# Patient Record
Sex: MALE | Race: BLACK OR AFRICAN AMERICAN | Employment: UNEMPLOYED | URBAN - METROPOLITAN AREA
[De-identification: names, ages, dates, MRNs, and addresses within clinical notes are randomized per-mention and may not be internally consistent; named-entity substitution may affect disease eponyms.]

---

## 2021-07-31 ENCOUNTER — APPOINTMENT (OUTPATIENT)
Dept: GENERAL RADIOLOGY | Facility: CLINIC | Age: 41
End: 2021-07-31
Attending: EMERGENCY MEDICINE
Payer: COMMERCIAL

## 2021-07-31 ENCOUNTER — HOSPITAL ENCOUNTER (EMERGENCY)
Facility: CLINIC | Age: 41
Discharge: HOME | End: 2021-07-31
Attending: EMERGENCY MEDICINE | Admitting: EMERGENCY MEDICINE
Payer: COMMERCIAL

## 2021-07-31 VITALS
TEMPERATURE: 98 F | SYSTOLIC BLOOD PRESSURE: 142 MMHG | RESPIRATION RATE: 18 BRPM | OXYGEN SATURATION: 99 % | WEIGHT: 172 LBS | HEART RATE: 74 BPM | DIASTOLIC BLOOD PRESSURE: 99 MMHG

## 2021-07-31 DIAGNOSIS — G47.00 INSOMNIA, UNSPECIFIED TYPE: ICD-10-CM

## 2021-07-31 DIAGNOSIS — R07.89 ATYPICAL CHEST PAIN: ICD-10-CM

## 2021-07-31 LAB
ALBUMIN SERPL-MCNC: 4 G/DL (ref 3.4–5)
ALP SERPL-CCNC: 54 U/L (ref 40–150)
ALT SERPL W P-5'-P-CCNC: 25 U/L (ref 0–70)
ANION GAP SERPL CALCULATED.3IONS-SCNC: 5 MMOL/L (ref 3–14)
AST SERPL W P-5'-P-CCNC: 17 U/L (ref 0–45)
ATRIAL RATE - MUSE: 81 BPM
BASOPHILS # BLD AUTO: 0 10E3/UL (ref 0–0.2)
BASOPHILS NFR BLD AUTO: 0 %
BILIRUB SERPL-MCNC: 0.5 MG/DL (ref 0.2–1.3)
BUN SERPL-MCNC: 8 MG/DL (ref 7–30)
CALCIUM SERPL-MCNC: 9 MG/DL (ref 8.5–10.1)
CHLORIDE BLD-SCNC: 104 MMOL/L (ref 94–109)
CO2 SERPL-SCNC: 31 MMOL/L (ref 20–32)
CREAT SERPL-MCNC: 0.83 MG/DL (ref 0.66–1.25)
D DIMER PPP FEU-MCNC: <0.27 UG/ML FEU (ref 0–0.5)
DIASTOLIC BLOOD PRESSURE - MUSE: NORMAL MMHG
EOSINOPHIL # BLD AUTO: 0.1 10E3/UL (ref 0–0.7)
EOSINOPHIL NFR BLD AUTO: 1 %
ERYTHROCYTE [DISTWIDTH] IN BLOOD BY AUTOMATED COUNT: 12.6 % (ref 10–15)
GFR SERPL CREATININE-BSD FRML MDRD: >90 ML/MIN/1.73M2
GLUCOSE BLD-MCNC: 164 MG/DL (ref 70–99)
HCT VFR BLD AUTO: 47.1 % (ref 40–53)
HGB BLD-MCNC: 15.4 G/DL (ref 13.3–17.7)
HOLD SPECIMEN: NORMAL
IMM GRANULOCYTES # BLD: 0 10E3/UL
IMM GRANULOCYTES NFR BLD: 0 %
INTERPRETATION ECG - MUSE: NORMAL
LIPASE SERPL-CCNC: 138 U/L (ref 73–393)
LYMPHOCYTES # BLD AUTO: 2.5 10E3/UL (ref 0.8–5.3)
LYMPHOCYTES NFR BLD AUTO: 28 %
MCH RBC QN AUTO: 28 PG (ref 26.5–33)
MCHC RBC AUTO-ENTMCNC: 32.7 G/DL (ref 31.5–36.5)
MCV RBC AUTO: 86 FL (ref 78–100)
MONOCYTES # BLD AUTO: 0.8 10E3/UL (ref 0–1.3)
MONOCYTES NFR BLD AUTO: 9 %
NEUTROPHILS # BLD AUTO: 5.4 10E3/UL (ref 1.6–8.3)
NEUTROPHILS NFR BLD AUTO: 62 %
NRBC # BLD AUTO: 0 10E3/UL
NRBC BLD AUTO-RTO: 0 /100
P AXIS - MUSE: 58 DEGREES
PLATELET # BLD AUTO: 324 10E3/UL (ref 150–450)
POTASSIUM BLD-SCNC: 3.8 MMOL/L (ref 3.4–5.3)
PR INTERVAL - MUSE: 150 MS
PROT SERPL-MCNC: 7.3 G/DL (ref 6.8–8.8)
QRS DURATION - MUSE: 92 MS
QT - MUSE: 362 MS
QTC - MUSE: 420 MS
R AXIS - MUSE: 31 DEGREES
RBC # BLD AUTO: 5.5 10E6/UL (ref 4.4–5.9)
SODIUM SERPL-SCNC: 140 MMOL/L (ref 133–144)
SYSTOLIC BLOOD PRESSURE - MUSE: NORMAL MMHG
T AXIS - MUSE: 42 DEGREES
TROPONIN I SERPL-MCNC: <0.015 UG/L (ref 0–0.04)
VENTRICULAR RATE- MUSE: 81 BPM
WBC # BLD AUTO: 8.8 10E3/UL (ref 4–11)

## 2021-07-31 PROCEDURE — 93005 ELECTROCARDIOGRAM TRACING: CPT | Performed by: EMERGENCY MEDICINE

## 2021-07-31 PROCEDURE — 99285 EMERGENCY DEPT VISIT HI MDM: CPT | Mod: 25 | Performed by: EMERGENCY MEDICINE

## 2021-07-31 PROCEDURE — 36415 COLL VENOUS BLD VENIPUNCTURE: CPT | Performed by: EMERGENCY MEDICINE

## 2021-07-31 PROCEDURE — 85379 FIBRIN DEGRADATION QUANT: CPT | Performed by: EMERGENCY MEDICINE

## 2021-07-31 PROCEDURE — 80053 COMPREHEN METABOLIC PANEL: CPT | Performed by: EMERGENCY MEDICINE

## 2021-07-31 PROCEDURE — 85025 COMPLETE CBC W/AUTO DIFF WBC: CPT | Performed by: EMERGENCY MEDICINE

## 2021-07-31 PROCEDURE — 93010 ELECTROCARDIOGRAM REPORT: CPT | Performed by: EMERGENCY MEDICINE

## 2021-07-31 PROCEDURE — 71046 X-RAY EXAM CHEST 2 VIEWS: CPT

## 2021-07-31 PROCEDURE — 84484 ASSAY OF TROPONIN QUANT: CPT | Performed by: EMERGENCY MEDICINE

## 2021-07-31 PROCEDURE — 83690 ASSAY OF LIPASE: CPT | Performed by: EMERGENCY MEDICINE

## 2021-07-31 RX ORDER — TRAZODONE HYDROCHLORIDE 50 MG/1
50-75 TABLET, FILM COATED ORAL
Qty: 15 TABLET | Refills: 0 | Status: ON HOLD | OUTPATIENT
Start: 2021-07-31 | End: 2021-11-11

## 2021-07-31 ASSESSMENT — ENCOUNTER SYMPTOMS
SLEEP DISTURBANCE: 1
APPETITE CHANGE: 1

## 2021-07-31 NOTE — ED PROVIDER NOTES
"ED Provider Note  [facility]      History     Chief Complaint   Patient presents with     Chest Pain     x2 nights     The history is provided by the patient and medical records.     Harley Daniel is an otherwise healthy 41 year old male who presents to the Emergency Department for evaluation of chest pain.  Patient states that he has had left-sided chest pain for the past month.  He describes the pain as heavy. Patient states that his chest pain worsens at night.  Patient reports that he has had this pain since he got his COVID-19 shot on 6/4/2021 per [facility].  Patient reports that he presented to an ER in [place] 3 weeks ago for his chest pain and no cause of his pain was found.   Patient states that for the last 10 days he has not been able to sleep much or eat.  He states that he \"feels off\".  Patient reports that he has never had problems with sleep before this.  Patient states that yesterday he presented to urgent care and was prescribed a sleeping medication that he took last night.  He states that he does not know the name of this medication, but that he did not like how it made him feel.  Patient states that he has tried OTC sleep medications and they have not worked either.  Patient states that he lives in [place] and that he has a PCP there.  Patient is currently visiting his family in the [facility] so that his mother can care for him while he is not feeling well.  Patient states he does not take any medications and that he does not have any health problems.  Patient states he does not smoke cigarettes.      Past Medical History  History reviewed. No pertinent past medical history.  History reviewed. No pertinent surgical history.  traZODone (DESYREL) 50 MG tablet      No Known Allergies  Family History  No family history on file.  Social History   Social History     Tobacco Use     Smoking status: Never Smoker     Smokeless tobacco: Never Used   Substance Use " Topics     Alcohol use: Not Currently     Drug use: Not Currently      Past medical history, past surgical history, medications, allergies, family history, and social history were reviewed with the patient. No additional pertinent items.       Review of Systems   Constitutional: Positive for appetite change.   Cardiovascular: Positive for chest pain.   Psychiatric/Behavioral: Positive for sleep disturbance.   All other systems reviewed and are negative.      Physical Exam   BP: (!) 142/94  Pulse: 98  Temp: 98.7  F (37.1  C)  Resp: 18  Weight: 78 kg (172 lb)  SpO2: 99 %  Physical Exam  Vitals and nursing note reviewed.   Constitutional:       Appearance: He is normal weight.   HENT:      Head: Normocephalic and atraumatic.   Eyes:      Extraocular Movements: Extraocular movements intact.      Pupils: Pupils are equal, round, and reactive to light.   Cardiovascular:      Rate and Rhythm: Normal rate and regular rhythm.      Heart sounds: Normal heart sounds.   Pulmonary:      Effort: Pulmonary effort is normal.      Breath sounds: Normal breath sounds.   Abdominal:      General: Bowel sounds are normal.      Palpations: Abdomen is soft.   Musculoskeletal:         General: Normal range of motion.      Cervical back: Normal range of motion and neck supple.   Skin:     General: Skin is warm and dry.      Coloration: Skin is not cyanotic.   Neurological:      General: No focal deficit present.      Mental Status: He is alert and oriented to person, place, and time.   Psychiatric:         Mood and Affect: Mood is anxious.         ED Course     1:37 PM  The patient was seen and examined by Cherrie Montemayor MD in Room ED01.  Procedures            EKG Interpretation:      Interpreted by Cherrie Montemayor MD  Time reviewed: 1330  Symptoms at time of EK month of left chest heaviness  Rhythm: normal sinus   Rate: normal  Axis: normal  Ectopy: none  Conduction: normal  ST Segments/ T Waves: No ST-T wave changes  Q Waves:  none  Comparison to prior: No old EKG available    Clinical Impression: normal EKG       Results for orders placed or performed during the hospital encounter of 07/31/21   XR Chest 2 Views     Status: None    Narrative    CHEST TWO VIEWS  7/31/2021 4:34 PM     HISTORY:  Chest pain.    COMPARISON: None.      Impression    IMPRESSION: Negative chest. Lungs clear. No pleural effusions.    KARTHIK ALANIZ MD         SYSTEM ID:  [id number]   Extra Blue Top Tube     Status: None   Result Value Ref Range    Hold Specimen JIC    Extra Red Top Tube     Status: None   Result Value Ref Range    Hold Specimen JIC    Extra Green Top (Lithium Heparin) Tube     Status: None   Result Value Ref Range    Hold Specimen JIC    Extra Purple Top Tube     Status: None   Result Value Ref Range    Hold Specimen JIC    D dimer quantitative     Status: Normal   Result Value Ref Range    D-Dimer Quantitative <0.27 0.00 - 0.50 ug/mL FEU    Narrative    This D-dimer assay is intended for use in conjunction with a clinical pretest probability assessment model to exclude pulmonary embolism (PE) and deep venous thrombosis (DVT) in outpatients suspected of PE or DVT. The cut-off value is 0.50 ug/mL FEU.   Troponin I     Status: Normal   Result Value Ref Range    Troponin I <0.015 0.000 - 0.045 ug/L   Lipase     Status: Normal   Result Value Ref Range    Lipase 138 73 - 393 U/L   Comprehensive metabolic panel     Status: Abnormal   Result Value Ref Range    Sodium 140 133 - 144 mmol/L    Potassium 3.8 3.4 - 5.3 mmol/L    Chloride 104 94 - 109 mmol/L    Carbon Dioxide (CO2) 31 20 - 32 mmol/L    Anion Gap 5 3 - 14 mmol/L    Urea Nitrogen 8 7 - 30 mg/dL    Creatinine 0.83 0.66 - 1.25 mg/dL    Calcium 9.0 8.5 - 10.1 mg/dL    Glucose 164 (H) 70 - 99 mg/dL    Alkaline Phosphatase 54 40 - 150 U/L    AST 17 0 - 45 U/L    ALT 25 0 - 70 U/L    Protein Total 7.3 6.8 - 8.8 g/dL    Albumin 4.0 3.4 - 5.0 g/dL    Bilirubin Total 0.5 0.2 - 1.3 mg/dL    GFR Estimate  >90 >60 mL/min/1.73m2   CBC with platelets and differential     Status: None   Result Value Ref Range    WBC Count 8.8 4.0 - 11.0 10e3/uL    RBC Count 5.50 4.40 - 5.90 10e6/uL    Hemoglobin 15.4 13.3 - 17.7 g/dL    Hematocrit 47.1 40.0 - 53.0 %    MCV 86 78 - 100 fL    MCH 28.0 26.5 - 33.0 pg    MCHC 32.7 31.5 - 36.5 g/dL    RDW 12.6 10.0 - 15.0 %    Platelet Count 324 150 - 450 10e3/uL    % Neutrophils 62 %    % Lymphocytes 28 %    % Monocytes 9 %    % Eosinophils 1 %    % Basophils 0 %    % Immature Granulocytes 0 %    NRBCs per 100 WBC 0 <1 /100    Absolute Neutrophils 5.4 1.6 - 8.3 10e3/uL    Absolute Lymphocytes 2.5 0.8 - 5.3 10e3/uL    Absolute Monocytes 0.8 0.0 - 1.3 10e3/uL    Absolute Eosinophils 0.1 0.0 - 0.7 10e3/uL    Absolute Basophils 0.0 0.0 - 0.2 10e3/uL    Absolute Immature Granulocytes 0.0 <=0.0 10e3/uL    Absolute NRBCs 0.0 10e3/uL   EKG 12-lead, tracing only     Status: None (Preliminary result)   Result Value Ref Range    Systolic Blood Pressure  mmHg    Diastolic Blood Pressure  mmHg    Ventricular Rate 81 BPM    Atrial Rate 81 BPM    NJ Interval 150 ms    QRS Duration 92 ms     ms    QTc 420 ms    P Axis 58 degrees    R AXIS 31 degrees    T Axis 42 degrees    Interpretation ECG Sinus rhythm  Normal ECG      Brunson Draw     Status: None    Narrative    The following orders were created for panel order Brunson Draw.  Procedure                               Abnormality         Status                     ---------                               -----------         ------                     Extra Blue Top Tube[[id number]]                              Final result               Extra Red Top Tube[[id number]]                               Final result               Extra Green Top (Lithium...[[id number]]                      Final result               Extra Purple Top Tube[[id number]]                            Final result                 Please view results for these tests on the individual  orders.   CBC with platelets differential     Status: None    Narrative    The following orders were created for panel order CBC with platelets differential.  Procedure                               Abnormality         Status                     ---------                               -----------         ------                     CBC with platelets and d...[[id number]]                      Final result                 Please view results for these tests on the individual orders.     Medications - No data to display     Assessments & Plan (with Medical Decision Making)   The patient has unremarkable pmh who presents to the ED for 1 month of left chest discomfort and insomnia. He says he has had issues since having his covid vaccinations.  He appears well on exam.   Diff dx:  Muscular pain, vaccination side effects, pe, pneumonia, acs, anxiety.  ECG appears normal.  Troponin <0.27. He appears mildly anxious on exam.  We are unable to Access old records.  Labs ordered and reviewed.  He denies cardiac risk factors.  No dm, htn, elevated cholesterol, smoker.  D dimer was sent a negative.  Comp met negative except blood glucose of 164.  Lipase normal.  Wbc normal.   cxr sent and negative.  His chest pain has been there for 1 month.  Etiology unclear.  I feel he can f/u with his pmd for this.  He needs help with insomnia.  He seems to be having anxiety which may be contributing to his symptoms.  I will start on trazodone and ask that he f/u with pmd for recheck.   I have reviewed the nursing notes. I have reviewed the findings, diagnosis, plan and need for follow up with the patient.    New Prescriptions    TRAZODONE (DESYREL) 50 MG TABLET    Take 1-1.5 tablets (50-75 mg) by mouth nightly as needed for sleep       Final diagnoses:   Insomnia, unspecified type   Atypical chest pain     IHaylie, am serving as a trained medical scribe to document services personally performed by Cherrie Montemayor MD, based on the  provider's statements to me.     I, Cherrie Montemayor MD, was physically present and have reviewed and verified the accuracy of this note documented by Haylie Mejia.  --  Cherrie Montemayor MD  [facility] EMERGENCY DEPARTMENT  7/31/2021     Cherrie Montemayor MD  07/31/21 0754

## 2021-07-31 NOTE — DISCHARGE INSTRUCTIONS
Please schedule a follow up appointment with your primary care doctor in 1-2 weeks for recheck of your current symptoms.      You can take trazodone for sleep.

## 2021-10-29 ENCOUNTER — TELEPHONE (OUTPATIENT)
Dept: BEHAVIORAL HEALTH | Facility: CLINIC | Age: 41
End: 2021-10-29

## 2021-10-29 ENCOUNTER — HOSPITAL ENCOUNTER (EMERGENCY)
Facility: CLINIC | Age: 41
Discharge: PSYCHIATRIC OR SUBSTANCE USE FACILITY | End: 2021-10-30
Attending: EMERGENCY MEDICINE | Admitting: EMERGENCY MEDICINE
Payer: COMMERCIAL

## 2021-10-29 DIAGNOSIS — F29 PSYCHOSIS, UNSPECIFIED PSYCHOSIS TYPE (H): ICD-10-CM

## 2021-10-29 DIAGNOSIS — F22 PARANOID (H): ICD-10-CM

## 2021-10-29 LAB
AMPHETAMINES UR QL SCN: NORMAL
BARBITURATES UR QL: NORMAL
BENZODIAZ UR QL: NORMAL
CANNABINOIDS UR QL SCN: NORMAL
COCAINE UR QL: NORMAL
OPIATES UR QL SCN: NORMAL
PCP UR QL SCN: NORMAL
SARS-COV-2 RNA RESP QL NAA+PROBE: NEGATIVE

## 2021-10-29 PROCEDURE — 99285 EMERGENCY DEPT VISIT HI MDM: CPT | Mod: 25

## 2021-10-29 PROCEDURE — 90791 PSYCH DIAGNOSTIC EVALUATION: CPT

## 2021-10-29 PROCEDURE — C9803 HOPD COVID-19 SPEC COLLECT: HCPCS

## 2021-10-29 PROCEDURE — 87635 SARS-COV-2 COVID-19 AMP PRB: CPT | Performed by: EMERGENCY MEDICINE

## 2021-10-29 PROCEDURE — 80307 DRUG TEST PRSMV CHEM ANLYZR: CPT | Performed by: EMERGENCY MEDICINE

## 2021-10-29 RX ORDER — CLONAZEPAM 1 MG/1
1 TABLET ORAL 2 TIMES DAILY PRN
Status: ON HOLD | COMMUNITY
End: 2021-11-11

## 2021-10-29 RX ORDER — ACETAMINOPHEN 325 MG/1
325-650 TABLET ORAL EVERY 6 HOURS PRN
Status: ON HOLD | COMMUNITY
End: 2021-11-11

## 2021-10-29 RX ORDER — OLANZAPINE 10 MG/1
10 TABLET, ORALLY DISINTEGRATING ORAL 2 TIMES DAILY PRN
Status: DISCONTINUED | OUTPATIENT
Start: 2021-10-29 | End: 2021-10-30 | Stop reason: HOSPADM

## 2021-10-29 ASSESSMENT — ENCOUNTER SYMPTOMS
NERVOUS/ANXIOUS: 1
SLEEP DISTURBANCE: 1

## 2021-10-29 NOTE — ED NOTES
Calm, cooperative. Denies any pain, pt reports being unsure why he is here. MD plan to speak to family member that is in waiting area for additional information.

## 2021-10-29 NOTE — ED NOTES
Bed: BH3  Expected date:   Expected time:   Means of arrival:   Comments:  [facility]  - 445 41 M Psych eval eta 150

## 2021-10-29 NOTE — ED NOTES
Video Observation initiated, patient informed. Patient changed into scrubs, belongings secured per unit protocol.     Crys Zendejas RN

## 2021-10-29 NOTE — ED PROVIDER NOTES
History   Chief Complaint:  Psychiatric Evaluation    The history is provided by the patient, the EMS personnel and a relative.      Harley Daniel is a 41 year old male who presents for a psychiatric evaluation. The patient comes to the ED after an appointment with his psychiatrist who placed him on a hold. He is joined by his cousin who he has been living with. His cousin says that for the last four days, he has been living out of hotels and his car, and that for the past two months, he has been acting strangely. He has been sleeping very little, is paranoid thinking that he and his children are in danger, and for the past two weeks, has not been taking his prescription medications. The patient denies any suicidal ideation, homicidal ideation, drug, or alcohol use; the patient's cousin is in agreement.     ShysinGerardo Contact Info:  Phone: [phone]    Review of Systems   Respiratory: Negative for shortness of breath.    Cardiovascular: Negative for chest pain.   Gastrointestinal: Negative for abdominal pain.   Psychiatric/Behavioral: Positive for sleep disturbance. Negative for suicidal ideas. The patient is nervous/anxious.         + paranoia   - no homicidal ideation    All other systems reviewed and are negative.      Allergies:  The patient has no known allergies.     Medications:  The patient is not currently taking any prescribed medications.    Past Medical History:    GERD  IBS  Lactose intolerance  Right trigeminal neuralgia  Dysphagia  Palpitations    Social History:  The patient presents to the ED with his cousin, coming from clinic.     Physical Exam     Patient Vitals for the past 24 hrs:   BP Temp Temp src Pulse Resp SpO2 Weight   10/29/21 1525 (!) 163/117 98.5  F (36.9  C) Oral 93 16 100 % 77.1 kg (170 lb)       Physical Exam  General: Patient in mild distress.  Alert and cooperative with exam.   HEENT: NC/AT. Conjunctiva without injection or scleral icterus. External ears  normal.  Respiratory: Breathing comfortably on room air  CV: Normal rate, all extremities well perfused  GI:  Non-distended abdomen  Skin: Warm, dry, no rashes/open wounds on exposed skin  Musculoskeletal: No obvious deformities  Neuro: Alert, answers questions appropriately. No gross motor deficits  Psychiatric: Evidence of paranoia. Evades some questioning. Denies SI, HI, hallucinations, depression.     Emergency Department Course     Laboratory:  Asymptomatic COVID19 Virus PCR by nasopharyngeal swab: Negative    Emergency Department Course:    Reviewed:  I reviewed nursing notes, vitals, past medical history and care everywhere    Assessments:  1550 I obtained history and examined the patient as noted above.     Disposition:  Pending inpatient mental health admission    Impression & Plan     Medical Decision Making:  Patient is a 41-year-old male who presents from mental health clinic with concern for decompensated mental illness and psychosis/paranoia.  On evaluation patient demonstrates paranoia and disorganized thinking.  He was placed on health officer hold prior to ED evaluation and this was converted to 72-hour hold.  Evaluated by DEC who is in agreement with need for inpatient mental health admission.  No indication for further labs or imaging at this time as patient is medically clear for inpatient mental health admission.  Patient remained calm and cooperative throughout my care.  He continues to await availability of inpatient mental health bed.  Signed out to my partner Dr. Browning pending bed availability.    Covid-19  Harley Daniel was evaluated during a global COVID-19 pandemic, which necessitated consideration that the patient might be at risk for infection with the SARS-CoV-2 virus that causes COVID-19.   Applicable protocols for evaluation were followed during the patient's care.   COVID-19 was considered as part of the patient's evaluation. The plan for testing is:  a test was obtained  during this visit.    Diagnosis:    ICD-10-CM    1. Paranoid (H)  F22    2. Psychosis, unspecified psychosis type (H)  F29      Scribe Disclosure:  I, Horace Carcamo, am serving as a scribe at 4:04 PM on 10/29/2021 to document services personally performed by Grant Lutz DO based on my observations and the provider's statements to me.        Grant Lutz DO  10/30/21 0036

## 2021-10-29 NOTE — ED TRIAGE NOTES
Patient coming from medical appointment. NP was concerned for patient's ability to care for self.

## 2021-10-30 ENCOUNTER — HOSPITAL ENCOUNTER (INPATIENT)
Facility: CLINIC | Age: 41
LOS: 12 days | Discharge: HOME | End: 2021-11-11
Attending: PSYCHIATRY & NEUROLOGY | Admitting: PSYCHIATRY & NEUROLOGY
Payer: COMMERCIAL

## 2021-10-30 ENCOUNTER — HOSPITAL ENCOUNTER (INPATIENT)
Age: 41
End: 2021-10-30
Payer: COMMERCIAL

## 2021-10-30 VITALS
DIASTOLIC BLOOD PRESSURE: 100 MMHG | TEMPERATURE: 98.5 F | OXYGEN SATURATION: 99 % | HEART RATE: 94 BPM | WEIGHT: 170 LBS | SYSTOLIC BLOOD PRESSURE: 150 MMHG | RESPIRATION RATE: 18 BRPM

## 2021-10-30 DIAGNOSIS — F20.3 UNDIFFERENTIATED SCHIZOPHRENIA (H): Primary | ICD-10-CM

## 2021-10-30 PROBLEM — F29 PSYCHOSIS (H): Status: ACTIVE | Noted: 2021-10-30

## 2021-10-30 PROCEDURE — 128N000001 HC R&B CD/MH ADULT

## 2021-10-30 PROCEDURE — 250N000013 HC RX MED GY IP 250 OP 250 PS 637: Performed by: INTERNAL MEDICINE

## 2021-10-30 PROCEDURE — 99223 1ST HOSP IP/OBS HIGH 75: CPT | Performed by: NURSE PRACTITIONER

## 2021-10-30 PROCEDURE — 250N000013 HC RX MED GY IP 250 OP 250 PS 637: Performed by: NURSE PRACTITIONER

## 2021-10-30 PROCEDURE — 124N000001 HC R&B MH

## 2021-10-30 RX ORDER — ACETAMINOPHEN 325 MG/1
650 TABLET ORAL EVERY 4 HOURS PRN
Status: DISCONTINUED | OUTPATIENT
Start: 2021-10-30 | End: 2021-11-11 | Stop reason: HOSPADM

## 2021-10-30 RX ORDER — HYDROXYZINE HYDROCHLORIDE 25 MG/1
25 TABLET, FILM COATED ORAL EVERY 4 HOURS PRN
Status: DISCONTINUED | OUTPATIENT
Start: 2021-10-30 | End: 2021-11-11 | Stop reason: HOSPADM

## 2021-10-30 RX ORDER — MAGNESIUM HYDROXIDE/ALUMINUM HYDROXICE/SIMETHICONE 120; 1200; 1200 MG/30ML; MG/30ML; MG/30ML
30 SUSPENSION ORAL EVERY 4 HOURS PRN
Status: DISCONTINUED | OUTPATIENT
Start: 2021-10-30 | End: 2021-11-11 | Stop reason: HOSPADM

## 2021-10-30 RX ORDER — BUSPIRONE HYDROCHLORIDE 10 MG/1
10 TABLET ORAL 3 TIMES DAILY
Status: DISCONTINUED | OUTPATIENT
Start: 2021-10-30 | End: 2021-11-10

## 2021-10-30 RX ORDER — CLONIDINE HYDROCHLORIDE 0.1 MG/1
0.2 TABLET ORAL ONCE
Status: COMPLETED | OUTPATIENT
Start: 2021-10-30 | End: 2021-10-31

## 2021-10-30 RX ORDER — ARIPIPRAZOLE 5 MG/1
5 TABLET ORAL DAILY
Status: DISCONTINUED | OUTPATIENT
Start: 2021-10-30 | End: 2021-11-01

## 2021-10-30 RX ORDER — TRAZODONE HYDROCHLORIDE 50 MG/1
50 TABLET, FILM COATED ORAL
Status: DISCONTINUED | OUTPATIENT
Start: 2021-10-30 | End: 2021-11-11 | Stop reason: HOSPADM

## 2021-10-30 RX ORDER — AMLODIPINE BESYLATE 5 MG/1
5 TABLET ORAL DAILY
Status: DISCONTINUED | OUTPATIENT
Start: 2021-10-30 | End: 2021-11-11 | Stop reason: HOSPADM

## 2021-10-30 RX ADMIN — BUSPIRONE HYDROCHLORIDE 10 MG: 10 TABLET ORAL at 21:14

## 2021-10-30 RX ADMIN — AMLODIPINE BESYLATE 5 MG: 5 TABLET ORAL at 00:05

## 2021-10-30 ASSESSMENT — ACTIVITIES OF DAILY LIVING (ADL)
DRESS: INDEPENDENT
ORAL_HYGIENE: INDEPENDENT
HYGIENE/GROOMING: INDEPENDENT
LAUNDRY: WITH SUPERVISION
HYGIENE/GROOMING: INDEPENDENT

## 2021-10-30 ASSESSMENT — ENCOUNTER SYMPTOMS
SHORTNESS OF BREATH: 0
ABDOMINAL PAIN: 0

## 2021-10-30 ASSESSMENT — MIFFLIN-ST. JEOR: SCORE: 1642.45

## 2021-10-30 NOTE — ED NOTES
Pt accepted to [facility] 5500 [phone] for RN report, they are able to get report after morning shift change at 0730.    Dr. Justin is the accepting

## 2021-10-30 NOTE — PHARMACY-ADMISSION MEDICATION HISTORY
PTA medication list was completed by pharmacist at [facility] on 10/29/2021.    Eleonora Harry, PharmD

## 2021-10-30 NOTE — PLAN OF CARE
Problem: Behavioral Health Plan of Care  Goal: Adheres to Safety Considerations for Self and Others  Outcome: No Change     Problem: Behavioral Health Plan of Care  Goal: Optimized Coping Skills in Response to Life Stressors  Outcome: No Change     Problem: Behavioral Health Plan of Care  Goal: Develops/Participates in Therapeutic Gates to Support Successful Transition  Outcome: No Change   Pt arrived via transport from [facility] at 11am.  Down to gown , Clothing and personal belongings assessment completed.

## 2021-10-30 NOTE — SAFE
Harley GARSIA María  October 29, 2021  SAFE Note    Critical Safety Issues: paranoia and delusional thoughts related to his belief that he is living in a movie      Current Suicidal Ideation/Self-Injurious Concerns/Methods: None - N/A      Current or Historical Inappropriate Sexual Behavior: No      Current or Historical Aggression/Homicidal Ideation: None - N/A      Triggers: none    Updated care team: Yes: Nelda consulted regard inpt admission    For additional details see full LMHP assessment.       JACQUELYN Ayala

## 2021-10-30 NOTE — ED PROVIDER NOTES
Pt received in signout with psych admission pending. No issues overnight.  Pt signed out awaiting admission.     Errol Browning MD  10/30/21 0659

## 2021-10-30 NOTE — ED NOTES
Hourly Round  Mood/Behavior: Sleeping  Comment: assumed carem pt is sleeping, moving freely in bed, respirations regular rate and pattern  Constant Monitoring: N/A

## 2021-10-30 NOTE — TELEPHONE ENCOUNTER
Patient cleared and ready for behavioral bed placement: Yes      S 40 y/o male presented to the [facility] ED with psychosis.     B:  Unclear if the pt has any prior MH dx.  Pt was referred by his NP office due to paranoid delusions. He believes people are following him, trying to harm him and that his life is like the movie [place].  Living in his car and different hotels.  No reported SI, HI or substance abuse.  Pt has refused to take medications over the last 2 weeks because he believes they are poison.  He has declined to answer most questions and has not described his hallucinations.  ED reported pt has been cooperative.    A: 72 HH started on 10/29 at 11:13 PM  No acute medical concerns.  Negative for COVID.  Drug screen is negative.     R: 0119 Placed on wait list.    0419 [place] called to say a bed on 4500 is now available.    0437 Dr. Johns accepts for 4500/[facility].  Placed in queue at 0440.  Unit notified and will call Intake when able to take disposition.  Awaiting callback.    0443 4500 called to say they are unable to take the pt and the ANS is requesting the pt be admitted to 5500.      0445 On-Call for 5500 paged.    0519  On-Call accepts for 5500AB/[facility].  Placed in queue at 0521.  Unit notified at 0535 and will call for report after shift change.  ED notified at 0537.

## 2021-10-30 NOTE — PROGRESS NOTES
10/30/21 1405   Engagement   Intervention Group   Topic Detail Creative endeavor for planning, organizing, attention to detail, time management, healthy leisure, and coping with symptoms through distraction   Attendance Did not attend   Reason for Not Attending Refused

## 2021-10-30 NOTE — CONSULTS
"10/29/2021  Harley SUN Daniel 1980     [facility] Mental Health Assessment:    Started at: 1905  Completed at: 2737  What type of assessment are you doing today? Crisis assessment    1.  Presenting Problem:      Referral Method to ED? Community Provider     What brings the patient to the ED today? Patient is a 41 year old [place] male using he/him pronouns being seen in the [facility] ED after being sent in on a hold from his NP's office where he was attending an appointment accompanied by his cousin.  Patient presents very disorganized with paranoia and it is difficult track his story or thoughts.  He states that he believes he is in a movie like [place] and every interaction he has along the way is another trap.  Patient does acknowledge being brought to this location, which he is not sure if it is a hospital or a alf, via ambulance.  Patient is calm and cooperative throughout the assessment.     Patient declines answering orientation questions (date, time, location).  He expresses concern about his medications being poison and that he is why he stopped them.  When he stopped them is unclear as patient reports 10 days and his cousin reports 14 days.  Patient is also not sleeping.  He is unable to identify where he has been staying and is unclear if he has been sleeping or not.  Patient describes his mood as \"nice and healthy\" and shares that he believes depression and anxiety are made up as a result of the medications being poison.  He identifies that his appetite is poor due to the poisoned medications.  Patient does not recall the last time he ate a meal.  Patient denies AH/VH and clearly exhibits paranoia and delusions through believing he is in a movie, people are out to harm him, and not sleeping.     Patient reports no previous inpatient mental health hospitalizations. Per chart review, patient has had a few ED visits starting in June 2021 with complaints of chest pain and insomnia.  Most recently " "patient was assessed at [facility] on 9/8/2021 with less intense complaints than today's presentation.  Patient denies SI/HI and does not provide clear response to SIB.  Patient does indicate that he has concerns about his digestive system linked to the medications he took being poisoned.  He was not able to clarify if he was concerned about nausea, constipation or loose stools.     Patient is not willing to disclose where he is living but does say he is by myself.  He expresses worry about doing harm to other people if he shares details about his living situation.  Patient tried to compare this to the stock market crash of 2007.  He denies any use of alcohol or drugs.  Patient does not identify having any supports or people he trusts however shares that family is \"very important\" to him.  When asked about wife and kids, patient shares that his wife left him and \"I think I have a lot of kids, but maybe only two.\"  Patient denies being physically and emotionally abused; when asked about sexual abuse, he responded \"maybe being in this movie that will happen.\"      Patient is not sure who his medical providers are for physical or mental health.  He does fixate on the doctors being the ones setting the traps, poisoning the medications, and making him do things to get through the movie.  He reports not being currently employed and was last employed about four months ago as a .      Has this happened before? No    Duration of presenting problem: about on month    Additional Stressors: n/a    The following information was received from cousin whose name is also Gerardo. Information was obtained phone call. Their phone number is [phone] and they last had contact with patient on today.    What happened today: cousin took patient to an appointment with an NP and shared concerns about patient's mental health stability.  Patient demonstrated odd and disorganized thoughts at the NP's office so they called " for EMS to bring patient to the hospital.  Cousin has observed patient declining and becoming unwell with his mental health for past 2 months.     What is different about patient's functioning: stopped taking medications 2 weeks ago, increased paranoia, eloped from cousin's house four days ago and returned today.  Cousin is not sure where patient was during that time.  Cousin reports that patient has not been getting much, if any, sleep for the past month.      Cousin shares that patient and his family live in [place]. However, patient's wife is traveling back from [facility] with four of their five children (the 5th child already is here and is staying at this cousin's home as well).      Concern about alcohol/drug use: No    What do you think the patient needs: inpatient hospitalization for stabilization     Has patient made comments about wanting to kill themselves/others:  No    If d/c is recommended, can they take part in safety/aftercare planning: Yes cousinGerardo, wants to be involved in plan of action discussion, care planning, and safety planning.       2.  Risk Assessment:  Suicide and Self-Harm    ESS-6  1.a. Over the past 2 weeks, have you had thoughts of killing yourself? No   1.b. Have you ever attempted to kill yourself and, if yes, when did this last happen? No  2. Recent or current suicide plan? No  3. Recent or current intent to act on ideation? No  4. Lifetime psychiatric hospitalization? No  5. Pattern of excessive substance use? No  6. Current irritability, agitation, or aggression? No  ESS-6 Score: negligible     SI: N/A  Plan: No  Intent: No   Prior Attempts: No     Protective Factors: staying with family while wife is out of the country    Hopes and goals for the future: unsure    Coping Skills: What helps and doesn't help? Did not identify any    Additional Risk Factors Related to Safety and Suicide: Yes: Poor decision making and Significant behavioral changes    Is the patient engaged in  self injurious behaviors? No     Risk to Others    Aggressive/Assaultive/Homicidal Risk Factors: No     Duty to Warn? No     Was a Child Protection Report Made? No       Was a Adult Protection Report Made? No        Sexually inappropriate behavior? No        Vulnerability to sexual exploitation? No     Additional information: n/a      3. Mental Health Symptoms and Substance Use  Current Symptoms and Mental Health History    GAIN Short Screener (GAIN-SS) administered? NA    Attention, Hyperactivity, and Impulsivity Symptoms      Patient reported symptoms related to hyperactivity, inattention, or impulsivity? No    Anxiety Symptoms    Patient reported anxiety symptoms? No       Behavioral Difficulties    Patient reported behavioral difficulties? Yes: Impulsivity/Disinhibition and Wandering     Mood Symptoms    Patient reported mood disorder symptoms? Yes: Impaired concentration, Impaired decision making , Risky behaviors and Sleep disturbance      Eating Disorders and Appetite Disturbance      Patient reported appetite symptoms? No     SCOFF  Do you make yourself sick (induce vomiting) because you feel uncomfortably full? No   Do you worry that you have lost Control over how much you eat? No  Have you recently lost more than 14 lb in a three-month period? No   Do you think you are too fat, even though others say you are too thin? No   Would you say that food dominates your life? No  SCOFF Score: 0    Patient reported appetite or eating disorder symptoms? No    Interpersonal Functioning     Patient reported difficulties that may be associated with personality and interpersonal functioning? No    Learning Disabilities/Cognitive/Developmental Disorders    Patient reported concerns related to learning disabilities, cognitive challenges, and/or developmental disorders? No     General Cognitive Impairments    Patient reported symptoms of cognitive impairments? Yes: Decision-Making, Judgment/Insight and Orientation patient  declines to provide answers to date, time, location.     Sleep Disturbance    Patient reported difficulties with sleep? Yes: Difficulty falling asleep  and Difficulty staying sleep    Patient and cousin are not sure when patient slept last.      Psychosis Symptoms    Patient reported symptoms of psychosis? Yes: Delusions: Ideas of Reference: believes he is living in a  movie and Paranoia      Trauma and Post-Traumatic Stress Disorder    Physical Abuse: No   Emotional/Psychological Abuse: No  Sexual Abuse: no but referenced maybe doing something in this movie  Loss of a friend or family member to suicide: No  Other Traumatic Event: No     Patient reported trauma related symptoms? No     Impact of Mental Health on Functioning      Negative Impact Score: 4/10   Subjective Impact on functioning: patient is calm, cooperative and does not seem to be negatively impacted by his current mental status.   How do symptoms vary from baseline? Increased paranoia, delusional thoughts, lack of sleep    Current and Historical Substance Use Note:    IIs there a history of, or current, substance use? No     Have you been to chemical dependency treatment or detox before? No     CAGE-AID    Have you felt you ought to cut down on your drinking or drug use? No     Have people annoyed you by criticizing your drinking or drug use? No   Have you felt bad or guilty about your drinking or drug use? No  Have you ever had a drink or used drugs first thing in the morning to steady your nerves or to get rid of a hangover? No   CAGE-AID Score: 0/4    Drug screen completed? Yes negative for all tested substances   BAL/Breathalyzer completed? No       Mental Status Exam:    Affect: Appropriate  Appearance: Appropriate   Attention Span/Concentration: Attentive    Eye Contact: Engaged  Fund of Knowledge: Other: not always based in reality   Language /Speech Content: Fluent  Language /Speech Volume: Normal   Language /Speech Rate/Productions: Normal    Recent Memory: Variable  Remote Memory: Variable  Mood: Normal   Orientation:   Person: Yes   Place: No  Time of Day: No and Answer: declined to answer   Date: No and Answer: declined to answer   Situation (Do they understand why they are here?): No   Psychomotor Behavior: Normal   Thought Content: Delusions  Thought Form: Other: fixated on being in a movie, doctors are setting traps    4. Social and Environmental Conditions   Is the patient their own guardian? Yes    Living Situation: With others: staying with a cousin    Support system and quality of connections: no identified    Income source: Other: no current income acknowledged    Issues with employment or education: No    Legal Concerns  Do you have any history of or current involvement with the legal system? No    Spiritual and Cultural Influences  Do you have any Gnosticist beliefs that are important in your life? No     Do you have any cultural influences in your life that impact your mental health care? No        5. Psychiatric History, Medical History, and Current Care      Patient Mental Health Services   Does the patient have a history of mental health concerns/diagnoses? No     Current Providers  Primary Care Provider: unknown   Psychiatrist: did not disclose   Therapist: unknown   : unknown   [facility]: No   ACT Team: No   Other: No    History of Commitment? No  History of Psychiatric Hospitalizations? No   History of programmatic care? No    Family Mental Health History   Family History of Mental Health or Chemical Dependency Issues? Did not disclose     Development and Physical Health Challenges  Delays or concerns meeting developmental milestones? No  Current psychotropic medications? Yes but not taking due to thinking they are poison   Medication Compliant? No: thinks his medications are posioned   Recent medication changes? NA    History of concussion or TBI? No     Additional Information: n/a    6. Collateral Information and  Collaboration    Collaboration with medical staff:Referral Information:   Medical Records, Nursing and Referring Provider     Collateral Information/Sources: Medical Records: EPIC and Family: cousinGerardo    7. Assessment and Diagnosis  Assessment of patient strengths and vulnerabilities    Strengths, Protective Factors, & Community Resources: see narrative    Patient skills, abilities, and coping skills (what is going well?): see narrative    Patient vulnerabilities: see narrative    Diagnosis  F29.0 Unspecified Schizophrenia Spectrum and other psychotic disorder rule out    8.Therapeutic Methodologies Utilized in Assessment    Psychotherapy techniques and/or interventions used: Establishing rapport, Active listening, Assess dimensions of crisis, Identify additional supports and alternative coping skills and Brief Supportive Therapy    9. Patient Care/Treatment Plan  Summary of Patient Presentation and needs  What are the basic needs for this patient in this moment? Medications restarted to manage mental health status    Consultations :  Attending provider consulted? Yes  Attending Name: Nelda   Attending concurs with disposition? Yes     Recommended disposition: Inpatient Mental Health     Does the patient agree with the recommended level of care? Unable to clearly consent to treatment at this time    Final disposition: Inpatient mental health     Disposition Details: Patient is recommended for inpatient MH admission to stabilize mental health status.     If Inpatient, is patient admitted voluntary? No, 72 hour hold   Patient aware of potential for transfer if there is not appropriate placement? No  Patient is willing to travel outside of the [facility] for placement? No   Central Intake Notified? Yes: Date: 10/29/2021 Time: 2015.    10. Patient Care Document: Safety and After Care Planning:          Safety Plan Provided? No    Follow-Up Plans and Providers: MICHELLE    Follow-Up Plan:  After care plan provided to the  patient/guardian by: not at this time  After care plan provided to any additional sources/parties? No    Duration of face to face time with patient in minutes: 1.50 hrs    CPT code(s) utilized: 73225 - Psychotherapy for Crisis - 60 (30-74*) min      JACQUELYN Ayala

## 2021-10-30 NOTE — H&P
"PSYCHIATRY   HISTORY AND PHYSICAL     DATE OF SERVICE   10/30/2021         CHIEF COMPLAINT   \"This is another [facility]\"       HISTORY OF PRESENT ILLNESS   This is a 41 year old male with history of Generalized anxiety disorder was presented to the ED by his cousin after being sent in on a hold from his NP's appointment due to concerns related to psychosis. Current legal status is 72 hour hold. Patient is a 41 year old [place] male,  with kids, currently domiciled at home with his cousin, he receives supports from the family members who was admitted to the inpatient psychiatric unit due to psychosis. Patient was seen and evaluated in the consult room by himself. Care coordination performed in details includes obtaining collateral information from the [facility] and care everywhere records.     Patient presented during this interview as paranoid, Tangential, fearful, disorganized and delusional. Patient keeps looking around the room as he appeared to be internally stimulated. At a point, he was observed examining his fingers critically while asking writer \"do you think my fingers are real?\" When writer answered in the affirmative, he smiled and said \"you lied, I think I'm a robot\". Patient stated he believes he is an electronic device, saying his brain is the CPU. Patient told writer this hospital is another [facility], saying everything is just a set up. Patient reported he believes his wife and 5 kids who are currently in [place] are not safe at this time. When questioned why he thinks his family is not safe, he stated that's past of the reason he is here. Patient stated he believes some people he had helped in the past are after him. Patient currently denies suicidal and homicidal thoughts. Though he denied both auditory and visual hallucinations, he appeared to be responding to internal stimuli. Patient described his life as being trapped since 2007 by the same people. Patient stated he feels as though the doctors are part " of the reason while his life is being trapped at the moment. Patient, will not tell writer about his current living situation, though records show he has been staying with his cousin until 5 days ago when he eloped and reappeared back yesterday with cousin not sure where he was during the missing period. Per chart review, cousin reports patient has not been getting enough sleep for the past month. Cousin also reported patient has stopped taking his medication 2 weeks ago due to increased paranoia per chart review.     Patient has no history of psychiatric hospitalization, though he has PCP that he sees for his anxiety. Patient who insisted he is not going to take any medication, is currently on Prozac 20 mg daily for anxiety and depression will be restarted.  Writer discussed starting patient on Abilify 5 mg daily to help target psychosis and Buspar 10 mg TID for ongoing anxiety of which he declined. Writer does review medications side effects, risks and benefit with patients. Medication is ordered in case he changes his mind.        ................................... ED 's note.............................................  [facility] Mental Health Assessment:    Started at: 1905  Completed at: 3776  What type of assessment are you doing today? Crisis assessment     1.  Presenting Problem:      Referral Method to ED? Community Provider      What brings the patient to the ED today? Patient is a 41 year old Citizen of [place] male using he/him pronouns being seen in the [facility] ED after being sent in on a hold from his NP's office where he was attending an appointment accompanied by his cousin.  Patient presents very disorganized with paranoia and it is difficult track his story or thoughts.  He states that he believes he is in a movie like [place]ination and every interaction he has along the way is another trap.  Patient does acknowledge being brought to this location, which he is not sure if it is a hospital or a  "[facility], via ambulance.  Patient is calm and cooperative throughout the assessment.      Patient declines answering orientation questions (date, time, location).  He expresses concern about his medications being poison and that he is why he stopped them.  When he stopped them is unclear as patient reports 10 days and his cousin reports 14 days.  Patient is also not sleeping.  He is unable to identify where he has been staying and is unclear if he has been sleeping or not.  Patient describes his mood as \"nice and healthy\" and shares that he believes depression and anxiety are made up as a result of the medications being poison.  He identifies that his appetite is poor due to the poisoned medications.  Patient does not recall the last time he ate a meal.  Patient denies AH/VH and clearly exhibits paranoia and delusions through believing he is in a movie, people are out to harm him, and not sleeping.      Patient reports no previous inpatient mental health hospitalizations. Per chart review, patient has had a few ED visits starting in June 2021 with complaints of chest pain and insomnia.  Most recently patient was assessed at [facility] on 9/8/2021 with less intense complaints than today's presentation.  Patient denies SI/HI and does not provide clear response to SIB.  Patient does indicate that he has concerns about his digestive system linked to the medications he took being poisoned.  He was not able to clarify if he was concerned about nausea, constipation or loose stools.      Patient is not willing to disclose where he is living but does say he is by myself.  He expresses worry about doing harm to other people if he shares details about his living situation.  Patient tried to compare this to the stock market crash of 2007.  He denies any use of alcohol or drugs.  Patient does not identify having any supports or people he trusts however shares that family is \"very important\" to him.  When asked about wife and " "kids, patient shares that his wife left him and \"I think I have a lot of kids, but maybe only two.\"  Patient denies being physically and emotionally abused; when asked about sexual abuse, he responded \"maybe being in this movie that will happen.\"       Patient is not sure who his medical providers are for physical or mental health.  He does fixate on the doctors being the ones setting the traps, poisoning the medications, and making him do things to get through the movie.  He reports not being currently employed and was last employed about four months ago as a .       Has this happened before? No     Duration of presenting problem: about on month     Additional Stressors: n/a     The following information was received from cousin whose name is also Gerardo. Information was obtained phone call. Their phone number is [phone] and they last had contact with patient on today.     What happened today: cousin took patient to an appointment with an NP and shared concerns about patient's mental health stability.  Patient demonstrated odd and disorganized thoughts at the NP's office so they called for EMS to bring patient to the hospital.  Cousin has observed patient declining and becoming unwell with his mental health for past 2 months.      What is different about patient's functioning: stopped taking medications 2 weeks ago, increased paranoia, eloped from cousin's house four days ago and returned today.  Cousin is not sure where patient was during that time.  Cousin reports that patient has not been getting much, if any, sleep for the past month.       Cousin shares that patient and his family live in [place]. However, patient's wife is traveling back from [place] with four of their five children (the 5th child already is here and is staying at this cousin's home as well).              CHEMICAL DEPENDENCY HISTORY   History   Drug Use Unknown       Social History    Substance and Sexual Activity      " Alcohol use: Not Currently      History   Smoking Status     Never Smoker   Smokeless Tobacco     Never Used       Treatment: Patient denies   Detox: Patient denies  Legal: 72 hour hold       PAST PSYCHIATRIC HISTORY   Psychiatrist: Not able to recall  Therapist: No  Case Management: No  Hospitalizations: No  History of Commitment: No  Past Medications: Prozac 20 mg  ECT:  No  Suicide Attempts/Gun Access: Patient denies suicide attempts/ He denies gun access  Community Supports: Cousin       PAST MEDICAL HISTORY   No past medical history on file.    No past surgical history on file.    Primary Care Provider: No Ref-Primary, Physician  Medications:     ARIPiprazole  5 mg Oral Daily     busPIRone  10 mg Oral TID     FLUoxetine  20 mg Oral Daily     Medications as needed: acetaminophen, alum & mag hydroxide-simethicone, hydrOXYzine, traZODone    ALLERGIES: Patient has no known allergies.       MEDICATIONS   No current outpatient medications on file.       Medication adherence issues: MS Med Adherence Y/N: Yes, Not confident in efficacy of medication  Medication side effects: MEDICATION SIDE EFFECTS: no side effects reported  Benefit: Yes / No: Yes       ROS   Review of Systems   Constitutional: Negative for fever.   Respiratory: Negative for shortness of breath.    Cardiovascular: Negative for chest pain.   Gastrointestinal: Negative for abdominal pain.   Psychiatric/Behavioral: Positive for Paranoia, delusion, Anxiety   All other systems reviewed and are negative       FAMILY HISTORY   No family history on file.     Psychiatric: Patient denies   Chemical: Patient denies   Suicide: Patient denies        SOCIAL HISTORY   Social History     Socioeconomic History     Marital status:      Spouse name: Not on file     Number of children: Not on file     Years of education: Not on file     Highest education level: Not on file   Occupational History     Not on file   Tobacco Use     Smoking status: Never Smoker      Smokeless tobacco: Never Used   Substance and Sexual Activity     Alcohol use: Not Currently     Drug use: Not Currently     Sexual activity: Not Currently   Other Topics Concern     Not on file   Social History Narrative     Not on file     Social Determinants of Health     Financial Resource Strain:      Difficulty of Paying Living Expenses:    Food Insecurity:      Worried About Running Out of Food in the Last Year:      Ran Out of Food in the Last Year:    Transportation Needs:      Lack of Transportation (Medical):      Lack of Transportation (Non-Medical):    Physical Activity:      Days of Exercise per Week:      Minutes of Exercise per Session:    Stress:      Feeling of Stress :    Social Connections:      Frequency of Communication with Friends and Family:      Frequency of Social Gatherings with Friends and Family:      Attends Anabaptism Services:      Active Member of Clubs or Organizations:      Attends Club or Organization Meetings:      Marital Status:    Intimate Partner Violence:      Fear of Current or Ex-Partner:      Emotionally Abused:      Physically Abused:      Sexually Abused:        [facility]ised[facility]  Occupation: Not working  Marital Status:   Children: 5 children  Legal: 72 hour hold  Living Situation: Living arrangements - the patient lives with their family and lives with an adult   ASSETS/STRENGTHS:  Anabaptism eloy       MENTAL STATUS EXAM   Appearance:  Casually groomed  Mood:  {Mood: Anxious , Fearful and Ambivalence  Affect: full range  was congruent to speech, mood congruent, intensity is normal and reactive  Suicidal Ideation: PRESENT / ABSENT: absent   Homicidal Ideation: PRESENT / ABSENT: absent   Thought process: tangential, disorganized, loose associations and thought blocking   Thought content: denies suicidal ideation, violent ideation and significant for paranoid ideation, delusion and magical thinking.   Fund of Knowledge:  "Average  Attention/Concentration: Easily distracted  Language ability:  Intact  Memory:  Immediate recall intact, Short-term memory impaired and Long-term memory impaired  Insight:  poor.  Judgement: poor  Orientation: Person:  No, believes he is robot  Place:  No, thinks he is in alf  Time:  yes  Situation:  yes  Psychomotor Behavior: denies tardive dyskinesia, dystomia or tics    Muscle Strength and Tone: MuscleStrength: Normal  Gait and Station: Normal       PHYSICAL EXAM   Vitals: Temp 98.1  F (36.7  C) (Oral)   Resp 16   Ht 1.778 m (5' 10\")   Wt 73.1 kg (161 lb 3.2 oz)   BMI 23.13 kg/m      Physical exam as per Agus Herring CNP. Dated 10/30/21           LABS   personally reviewed.   No visits with results within 2 Month(s) from this visit.   Latest known visit with results is:   No results found for any previous visit.     No results found for: PHENYTOIN, PHENOBARB, VALPROATE, CBMZ       ASSESSMENT   Patient is a 41 year old Montenegrin male who was admitted to the inpatient psychiatric unit due to psychosis. Patient sent to the hospital on a hold by his PCP after exhibited psychosis during his clinic appointment. Patient with no history of psychiatric hospitalization presented as disorganized, delusional, tangential and fearful throughout the interview session. Patient believes he is not human, saying he is a robot. Patient denies suicidal and homicidal thoughts. Patient will be started on Abilify 5 mg daily and Buspar 10 mg TID, though he insisted he will not take any medication.        DIAGNOSIS   Principal Problem:    Psychosis (H)    Active Problem List:  Patient Active Problem List   Diagnosis     Psychosis (H)          PLAN   1. Education given regarding diagnostic and treatment options with risks, benefits and alternatives and adequate verbalization of understanding.  2. Admitted unit 5500 AB on a 72 hour hold due to psychosis.    Precautions placed .  3. Medications: 10/30/2021: PTA medications " reviewed.  Prozac  4. Medications:  Hospital  Prozac 20 mg daily  Abilify 5 mg daily  Buspar 10 mgTID  5. Consultations:  Hospitalist to follow as needed  6. Structure and Supervision  Unit 4500  Barriers to Discharge: Exacerbation of symptoms  7.   is following in regards to collecting and reviewing collateral information, referrals and disposition planning.  Legal:  72 hour hold  Care Coordination: Will care-coordinate with the family and outpatient providers when he signs ROIs   Further treatment programming to be determined throughout the hospital course.        Risk Assessment: [facility] RISK ASSESSMENT: Patient able to contract for safety    This note was created with help of Dragon dictation system. Grammatical / typing errors are not intentional.    HUAN Lewis CNP       CERTIFICATION   Initial Certification I certify that the inpatient psychiatric facility admission was medically necessary for treatment which could   reasonably be expected to improve the patient s condition.                                       I estimate 3-5 days of hospitalization is necessary for proper treatment of the patient. My plans for post-hospital care for this patient are home with family.                                       HUAN Lewis CNP     -     10/30/2021     -     12:55 PM

## 2021-10-30 NOTE — TELEPHONE ENCOUNTER
0119 Bed Search Update:    No availability at [facility] or [facility].  Pt is requesting placement in the [facility].    [facility] [place] [place]-No beds available  [facility]-No beds available  [facility]-No beds available  [facility]  [facility]-No beds available  [facility]No beds available  [facility] beds available  [facility]-No beds available  [facility]-No beds available  [facility]-Low acuity only. Mixed unit (adol, adult and janee)  [place]-No beds available.  No current aggression    Pt remains on wait list pending bed availability.

## 2021-10-30 NOTE — PHARMACY-ADMISSION MEDICATION HISTORY
Pharmacy Medication History  Admission medication history interview status for the 10/29/2021  admission is complete. See EPIC admission navigator for prior to admission medications     Location of Interview: Outside patient room but on unit  Medication history sources: Surescripts and Care Everywhere    In the past week, patient estimated taking medication this percent of the time: less than 50% due to other    Medication reconciliation completed by provider prior to medication history? No    Time spent in this activity: 10 min    Prior to Admission medications    Medication Sig Last Dose Taking? Auth Provider   acetaminophen (TYLENOL) 325 MG tablet Take 325-650 mg by mouth every 6 hours as needed for pain PRN Yes Unknown, Entered By History   clonazePAM (KLONOPIN) 1 MG tablet Take 1 mg by mouth 2 times daily as needed for anxiety Past Month at Unknown time Yes Unknown, Entered By History   doxylamine (UNISOM) 25 MG TABS tablet Take 25 mg by mouth nightly as needed for sleep PRN Yes Unknown, Entered By History   FLUoxetine (PROZAC) 20 MG capsule Take 20 mg by mouth daily More than a month at Unknown time Yes Unknown, Entered By History   traZODone (DESYREL) 50 MG tablet Take 1-1.5 tablets (50-75 mg) by mouth nightly as needed for sleep More than a month at Unknown time Yes Cherrie Montemayor MD       The information provided in this note is only as accurate as the sources available at the time of update(s)

## 2021-10-31 PROCEDURE — 250N000013 HC RX MED GY IP 250 OP 250 PS 637: Performed by: NURSE PRACTITIONER

## 2021-10-31 PROCEDURE — 124N000001 HC R&B MH

## 2021-10-31 PROCEDURE — 99232 SBSQ HOSP IP/OBS MODERATE 35: CPT | Performed by: NURSE PRACTITIONER

## 2021-10-31 PROCEDURE — 128N000001 HC R&B CD/MH ADULT

## 2021-10-31 PROCEDURE — 250N000013 HC RX MED GY IP 250 OP 250 PS 637: Performed by: INTERNAL MEDICINE

## 2021-10-31 RX ORDER — CLONIDINE HYDROCHLORIDE 0.1 MG/1
0.1 TABLET ORAL DAILY PRN
Status: DISCONTINUED | OUTPATIENT
Start: 2021-10-31 | End: 2021-11-11 | Stop reason: HOSPADM

## 2021-10-31 RX ADMIN — AMLODIPINE BESYLATE 5 MG: 5 TABLET ORAL at 10:00

## 2021-10-31 RX ADMIN — CLONIDINE HYDROCHLORIDE 0.1 MG: 0.1 TABLET ORAL at 19:40

## 2021-10-31 RX ADMIN — CLONIDINE HYDROCHLORIDE 0.2 MG: 0.1 TABLET ORAL at 00:05

## 2021-10-31 RX ADMIN — AMLODIPINE BESYLATE 5 MG: 5 TABLET ORAL at 00:05

## 2021-10-31 ASSESSMENT — ACTIVITIES OF DAILY LIVING (ADL)
HYGIENE/GROOMING: SHOWER;INDEPENDENT;PROMPTS
ORAL_HYGIENE: PROMPTS
HYGIENE/GROOMING: INDEPENDENT

## 2021-10-31 NOTE — ED PROVIDER NOTES
Pt spent a long time talking with his provider. Now agrees to take anti hypertensive. Norvasc given. Still refusing other medications. Pt told provider he will try to eat lunch. Snack offered now but pt still refused.    Addendum added please note computer error listing this note as written by ED provider. PLEASE NOTE This is a nurses progress note written by this writer. Unable to change computer error.

## 2021-10-31 NOTE — PROGRESS NOTES
Pt's BP remains high with P WNL. Pt appears calm but with paranoia is increasing anxiety. Monitor BP after taking Buspar at hs. Continue to allow pt to hold unopened medication and open it himself and encourage to take it.

## 2021-10-31 NOTE — PROGRESS NOTES
10/31/21 1418   Engagement   Intervention Group   Topic Detail Fall/Halloween activity for healthy leisure, reality orientation, social skills, and coping with symptoms through distraction.   Attendance Did not attend   Reason for Not Attending Refused

## 2021-10-31 NOTE — PROGRESS NOTES
Nurse spoke with MD regarding high BP and medications ordered. Pt refused them and C/O chest pain and rubbing over chest and abdomen. Denies N/V.  Denies H/A. Pt very paranoid.  A staff person that speaks Somalian spoke with pt and after a period of time, pt did take the medications reluctantly with warm water. Continue to monitor BP and will be F/U tomorrow with MD.

## 2021-10-31 NOTE — PROGRESS NOTES
"Pt had a visit from his Cousin. Patient will not sign SONALI for cousin. Gerardo's cousin reports that Gerardo said he knows his cousin looks like himself but knows he is not really him. \"you are really someone else\" pt remains paranoid, guarded. Pt asked to be called Gerardo.     Cousin wants provider to know pt has been thinking people are watching him and left his motel thinking \"people were writing things about him\"  Pt has reportedly not been eating or sleeping due to paranoia.    I asked Gerardo' s' cousin  if he felt pt would benefit from interpretor. Cousin says Gerardo speaks very good English and denies need for interpretor. \"the problem is not language it is his thinking is \"Not right\" .  Pt also refuses need for interpretor.pt encouraged to notify staff if he ever wants interpretor services. Pt agrees.  "

## 2021-10-31 NOTE — CONSULTS
"[facility]  Consult Note - Hospitalist Service     Date of Admission:  10/30/2021  Consult Requested by: Agus Herring CNP  Reason for Consult: hypertension     Assessment & Plan   Harley \"Gerardo\" SUN Daniel is a 41 year old male who presented to [facility] emergency department on 10/29/2021, sent by his psychiatrist who placed him on a hold for psychiatric decompensation/paranoid thinking.  Past medical history includes severe anxiety, insomnia, low back pain, evaded blood pressure without diagnosis of hypertension, prediabetes.  Hospital medicine consulted to address elevated blood pressures on admission last evening.    #Elevated blood pressure without diagnosis of hypertension  -Blood pressure last evening 206/104.  Nocturnist addressed this and patient was given clonidine 0.2 mg x 1 and Norvasc 5 mg daily.  -Blood pressure this morning has improved 134/96.  He has been guarded, paranoid and took quite a bit of coaxing to get patient to take vital signs and his Norvasc 5 mg daily.  He eventually did consent to these things  -Blood pressure today has been 130s to 150s systolic, 90s to 100s diastolic.  Improved from last evening  -Continue Norvasc 5 mg daily.  I have added clonidine 0.1 mg once daily as needed for systolic blood pressure greater than 170 or diastolic blood pressure greater than 110  -No history of kidney disease.  No current labs.  9/8/2021 electrolytes and kidney function were normal  -I do not feel patient needs further work-up at this time.  Hospital medicine will sign off     #Acute psychosis  #Paranoia  #Insomnia  -Psychiatry managing       Total time spent on the unit 20 minutes in reviewing the record, examination of patient, lab results and completing documentation. 12 minutes was spent in discussion and counseling with patient concerning diagnosis, medications, lab results and treatment plan. Care discussed and coordinated with patient and nurse.     Irlanda" "HUAN Tolbert CNP  [facility]  Securely message with the [employer] Web Console (learn more here)  Text page via [employer] Paging/Directory             ______________________________________________________________________    Chief Complaint   Brian blood pressure    History is obtained from the patient    History of Present Illness   Harley \"Gerardo\" SUN Daniel is a 41 year old [place] male who presented to [facility] emergency department on 10/29/2021, sent by his psychiatrist who placed him on a hold for psychiatric decompensation/paranoid thinking.  Past medical history includes severe anxiety, insomnia, low back pain, evaded blood pressure without diagnosis of hypertension, prediabetes.  Hospital medicine consulted to address elevated blood pressures on admission last evening.  Nursing reporting that patient's cousin came to visit this morning.  He was able to talk patient into getting vital signs.  Nursing staff reporting patient has been paranoid and guarded with a short responses to their questions.  He has been reluctant to accept treatments including vital signs and medications.  He spoke with psychiatry nurse practitioner in length today.  He did get another blood pressure at that time and eventually agreed to take the amlodipine.  He would not take any other medications.  When I saw patient he was sitting alone at a table in [place] [facility] [place].  I asked him about his blood pressure and he rubbed his arms and said \"I am good.\"  He denies headache, blurred vision, dizziness, shortness of breath, palpitations or chest pain.  He would not allow physical exam with stethoscope.    Review of Systems   The 10 point Review of Systems is negative other than noted in the HPI     Past Medical History    I have reviewed this patient's medical history and updated it with pertinent information if needed.   Past Medical History:   Diagnosis Date     Anxiety with somatization 2008       Past Surgical " History   I have reviewed this patient's surgical history and updated it with pertinent information if needed.  No past surgical history on file.    Social History   I have reviewed this patient's social history and updated it with pertinent information if needed.  Social History     Tobacco Use     Smoking status: Never Smoker     Smokeless tobacco: Never Used   Substance Use Topics     Alcohol use: Not Currently     Drug use: Not Currently       Family History   Family history unknown    Medications   I have reviewed this patient's current medications    Allergies   No Known Allergies    Physical Exam   Vital Signs: Temp: 98.4  F (36.9  C) Temp src: Oral BP: (!) 134/96 Pulse: 86   Resp: 18 SpO2: 100 % O2 Device: None (Room air)    Weight: 161 lbs 3.2 oz    General Appearance: Alert, oriented, no apparent distress  Eyes: Negative icterus  HEENT: Normocephalic, atraumatic  Respiratory: Nonlabored, did not allow stethoscope exam  Cardiovascular: Patient refused stethoscope exam  GI: Abdomen visual expection nondistended, patient did not allow stethoscope exam  Skin: Skin intact, no visible rashes, bruising or open sores  Musculoskeletal: Gait steady, no joint deformities  Neurologic: Alert, speech intact/low tone, makes eye contact, moves all extremities equally, sensation intact  Psychiatric: Paranoid, guarded    Data   No results found for this or any previous visit (from the past 24 hour(s)).

## 2021-10-31 NOTE — SIGNIFICANT EVENT
Significant Event Note    Time of event: 11:18 PM October 30, 2021    Description of event:  Received a page as well as a phone call from the on-call nurse practitioner.  Patient was apparently admitted earlier today from [facility] where he presented with symptoms of paranoia.  Patient has been noted to be hypertensive throughout the day.  However as I look back at the records patient presented to the emergency department at [facility] approximately 24 hours ago and his blood pressure at that time was 163 systolic and diastolic was 117.  Patient was evaluated by the psychiatry nurse practitioner during the daytime at that time the blood pressure was also elevated however no formal consult for the hospitalist was placed.  Apparently patient has been refusing all medications and has taken the BuSpar only about 30 minutes ago.  The stat consult request was for elevated asymptomatic blood pressure in this patient.    Plan:  Patient's medical records were reviewed extensively.  Patient does not carry a prior history of documented hypertension.  He does not have a prior to admission medication which is suggestive of being antihypertensive in nature.  As mentioned above patient's blood pressure has been elevated since his presentation to [facility] more than 24 hours ago and has been persistently elevated throughout the day.  Patient is essentially asymptomatic from the perspective of hypertension.  At this point in time recommendations are to give the patient clonidine 0.2 mg which will hopefully help with his anxiety and sleep as well.  And 5 mg of Norvasc.  Formal consult will happen in the morning.  No indication of evaluating an asymptomatic hypertensive patient who is essentially noncooperative at this time    Discussed with: bedside nurse    Nancy Chen MD, A

## 2021-10-31 NOTE — PLAN OF CARE
Problem: Behavioral Health Plan of Care  Goal: Optimized Coping Skills in Response to Life Stressors  Outcome: No Change     Problem: Behavioral Health Plan of Care  Goal: Plan of Care Review  Outcome: No Change  Pt refusing medications thinking they are poison, but did look at Buspar pkg and finally opened it and took it and chewed it at hs. Pt requested hot water and did drink 360ccs.at hs

## 2021-10-31 NOTE — PROGRESS NOTES
Patient continues to be paranoid but finally agreed to go to his room at 0200 following several cues/encouragement(sleeping in the lounge). Patient declined to sleep in bed but laid on the floor in his room, slept from 0200 to 0500 (3 hours). Since patient was awake, had been standing close to the door entrance in his room. Patient reports no SI/HI, A/VH or SIB, continues to respond no to assessment questions. Though patient appears with flat and blunted affect, he remains calm at this time. Will continue to monitor and follow plan of care.    Taras Mclean DNP, RN, APRN, CNS, AGCNS-BC.

## 2021-10-31 NOTE — PROGRESS NOTES
"PSYCHIATRY  PROGRESS NOTE     DATE OF SERVICE   10/31/2021         CHIEF COMPLAINT   \"Not feeling great\"       SUBJECTIVE   Nursing reports: Pt had a visit from his Cousin. Patient will not sign SONALI for cousin. Gerardo's cousin reports that Gerardo said he knows his cousin looks like himself but knows he is not really him. \"you are really someone else\" pt remains paranoid, guarded.   Cousin wants provider to know pt has been thinking people are watching him and left his motel thinking \"people were writing things about him\"  Pt has reportedly now been eating or sleeping due to paranoia. Gerardo ' cousin asked if he felt pt would benefit from interpretor. Cousin says Gerardo speaks very good English and denies need for interpretor. \"the problem is not language it is his thinking is \"Not right\" .Pt also refuses need for interpretor.         OBJECTIVE   Chart reviewed and patient assessed.  Patient was seen and evaluated in the consult room by himself. Patient presented during this interview as paranoid, anxious, tangential and delusional.  Upon patient interview, patient reports he is not doing great right now. When writer inquired further about his physical and mental conditions, he stated he feels as though electronic is being planted somewhere in his stomach and the chest area. Patient who denies chest pain states he believes he is being controlled by the electronic in his body. Patient reported he started feeling strange after receiving a shock treatment in his arm in the [facility] ED yesterday. Patient stated his entire body felt numbed and thought he was going to die after receiving the shock treatment, hence the reason he thought they have turned him to robot yesterday. Per medical records, patient did not receive any shock treatment or injection in his arm while in the [facility] ED yesterday. Patient stated he believes something is currently wrong with his brain, saying he conditions have never been this bad. When " writer asked why he has been refusing to take medications since he came yesterday, he stated medications had never helped alleviating his symptoms in the past, saying medications make things go from bad to worse. Writer educated patient about his current symptoms which are different from what he had experienced in the past, hence, the reason why he is on antipsychotic that he has never taken before. After much encouragement, he promised writer he would take his medication later today. Of note, patient has been having elevated BP since yesterday (206/106 around 2200) needing hospitalst consult. The hospitalist ordered high BP medications which patient took last night but refused this morning when staff offered them. Writer checked patient's BP during this interview around 1045 which was still elevated at 154/89 sitting and 149/102 standing and Pulse 108. Writer seized the moment to educate patient about high BP and the reason while being med compliant is necessary. Patient was agreeable to taking BP medication after much teaching and encouragement. Patient did take the BP medication in the presence of this writer after the meeting. When asked why he has not been eating or drinking fluids since he came here, he stated he does not have appetite but promised to eat during lunch time. Patient denied suicidal and homicidal thoughts. Patient also denied both auditory and visual hallucinations.       MEDICATIONS   Medications:  Scheduled Meds:    amLODIPine  5 mg Oral Daily     ARIPiprazole  5 mg Oral Daily     busPIRone  10 mg Oral TID     FLUoxetine  20 mg Oral Daily     Continuous Infusions:  PRN Meds:.acetaminophen, alum & mag hydroxide-simethicone, cloNIDine, hydrOXYzine, traZODone    Medication adherence issues: MS Med Adherence Y/N: Yes, Not confident in efficacy of medication  Medication side effects: MEDICATION SIDE EFFECTS: no side effects reported  Benefit: Yes / No: Yes       ROS   A comprehensive review of  "systems was negative. Except noted in HPI       MENTAL STATUS EXAM   Vitals: BP (!) 134/96 (BP Location: Right arm, Patient Position: Sitting)   Pulse 86   Temp 98.4  F (36.9  C) (Oral)   Resp 18   Ht 1.778 m (5' 10\")   Wt 73.1 kg (161 lb 3.2 oz)   SpO2 100%   BMI 23.13 kg/m      Appearance:  Casually groomed  Mood:  \"not feeling great\"  Affect: full range  was congruent to speech, mood congruent, intensity is somewhat heightened and reative  Suicidal Ideation: PRESENT / ABSENT: absent   Homicidal Ideation: PRESENT / ABSENT: absent   Thought process: tangential, disorganized, response delay and thought blocking   Thought content: endorses delusions, paranoid ideation and denies suicidal and homicidal thoughts.   Fund of Knowledge: Average  Attention/Concentration: Easily distracted  Language ability:  Intact  Memory:  Immediate recall intact, Short-term memory intact and Long-term memory impaired  Insight:  poor.  Judgement: fair  Orientation: Yes, x4  Psychomotor Behavior: denies tardive dyskinesia, dystomia or tics    Muscle Strength and Tone: MuscleStrength: Normal  Gait and Station: Normal       LABS   personally reviewed.     No results found for: PHENYTOIN, PHENOBARB, VALPROATE, CBMZ       DIAGNOSIS   Principal Problem:    <principal problem not specified>    Active Problem List:  Patient Active Problem List   Diagnosis     Psychosis (H)          PLAN   1. Ongoing education given regarding diagnostic and treatment options with risks, benefits and alternatives and adequate verbalization of understanding.    2. Medications: Abilify 5 mg daily                           Buspar 10 mg TID                           Prozac 20 mg daily    3. Hospitalist consult: Hospitalist to see patient as needed. Patient's BP is currently elevated,Consult placed to the hospitalist,  hospitalist is aware and following patient     4. Legal: 72 hour hold    5.  to follow regarding collecting and reviewing " collateral information, referrals and disposition planning.      Risk Assessment: [facility] RISK ASSESSMENT: Patient able to contract for safety    Coordination of Care:   Treatment Plan reviewed and physician signed, Care discussed with Care/Treatment Team Members, Chart reviewed and Patient seen      Re-Certification I certify that the inpatient psychiatric facility services furnished since the previous certification were, and continue to be, medically necessary for, either, treatment which could reasonably be expected to improve the patient s condition or diagnostic study and that the hospital records indicate that the services furnished were, either, intensive treatment services, admission and related services necessary for diagnostic study, or equivalent services.     I certify that the patient continues to need, on a daily basis, active treatment furnished directly by or requiring the supervision of inpatient psychiatric facility personnel.   I estimate 3-5 days of hospitalization is necessary for proper treatment of the patient. My plans for post-hospital care for this patient are  home with family     HUAN Lewis CNP    -     10/31/2021     -     11:26 AM    Total time  30 minutes with > 50%spent on coordination of cares and psycho-education.    This note was created with help of Dragon dictation system. Grammatical / typing errors are not intentional.    HUAN Lewis CNP

## 2021-10-31 NOTE — PLAN OF CARE
Problem: Behavioral Health Plan of Care  Goal: Patient-Specific Goal (Individualization)  Outcome: No Change  Note: Shift Summery:  Pt sitiing in lounge but guarded and paranoid. Will not eat or talk . Shakes head yes or no to questions. Refused am medications     Patient's Stated Goal for Shift:  no stated goal    Goal Status: goals and plan of care in progress       Problem: Suicidal Behavior  Goal: Suicidal Behavior is Absent or Managed  Outcome: Improving   Shakes head No when asked if he has thoughts of hurting himself or others . Is on Suicide precautions    Bp elevated at 134/96 sitting and 151/100 standing . Pt will not take any medications.

## 2021-10-31 NOTE — PLAN OF CARE
Problem: Behavioral Health Plan of Care  Goal: Adheres to Safety Considerations for Self and Others  Outcome: Improving  Goal: Absence of New-Onset Illness or Injury  Outcome: Improving   Patient sitting in the [place] area, mood anxious, affect flat, blunted. Patient appears very paranoid but moderately cooperative. Had clonidine 0,2 mg and Norvasc 5 mg at 0005 for a B/P of 206/106. B/P rechecked at about 0045 was 126/95 and HR was 97. Patient currently denies chest pain, refused to go to bed even with cues. Writer asked if patient needed something to eat or drink but he declined. Meanwhile patient is calm in the lounge area,  patient exhibits no other behavior order than paranoia at this time. Will continue to monitor and follow plan of care.    Taras Mclean DNP, RN, APRN, CNS, AGCNS-BC

## 2021-10-31 NOTE — PROGRESS NOTES
Pt refused both breakfast and lunch today. Did take a candy bar offered to him by staff for halloween but has not eaten it yet. Pt drank one orange juice. Pt remains suspicious, guarded and paranoid. Hypervigilant on unit, sitting and watching staff and peers. Pt is easily distracted and may be responding to internal stimuli evidenced by his looking around as if responding to unknown stimuli.    Refused all medications today except for Norvasc. Pt has been out and visible on unit all shift but does not visit with peers, and does not attend groups. Continued attempts at building therapeutic relationship in progress.      Addendum added. Pt did eat one small candy bar.

## 2021-11-01 PROBLEM — F20.3 UNDIFFERENTIATED SCHIZOPHRENIA (H): Status: ACTIVE | Noted: 2021-11-01

## 2021-11-01 PROCEDURE — 124N000001 HC R&B MH

## 2021-11-01 PROCEDURE — 99233 SBSQ HOSP IP/OBS HIGH 50: CPT | Performed by: PSYCHIATRY & NEUROLOGY

## 2021-11-01 PROCEDURE — 128N000001 HC R&B CD/MH ADULT

## 2021-11-01 PROCEDURE — 250N000013 HC RX MED GY IP 250 OP 250 PS 637: Performed by: NURSE PRACTITIONER

## 2021-11-01 PROCEDURE — 250N000013 HC RX MED GY IP 250 OP 250 PS 637: Performed by: INTERNAL MEDICINE

## 2021-11-01 PROCEDURE — 90853 GROUP PSYCHOTHERAPY: CPT

## 2021-11-01 RX ORDER — ARIPIPRAZOLE 5 MG/1
5 TABLET ORAL 2 TIMES DAILY
Status: DISCONTINUED | OUTPATIENT
Start: 2021-11-01 | End: 2021-11-02

## 2021-11-01 RX ADMIN — AMLODIPINE BESYLATE 5 MG: 5 TABLET ORAL at 09:10

## 2021-11-01 RX ADMIN — ARIPIPRAZOLE 5 MG: 5 TABLET ORAL at 09:10

## 2021-11-01 RX ADMIN — FLUOXETINE HYDROCHLORIDE 20 MG: 20 CAPSULE ORAL at 09:10

## 2021-11-01 RX ADMIN — BUSPIRONE HYDROCHLORIDE 10 MG: 10 TABLET ORAL at 09:10

## 2021-11-01 ASSESSMENT — ACTIVITIES OF DAILY LIVING (ADL)
HYGIENE/GROOMING: INDEPENDENT
ORAL_HYGIENE: INDEPENDENT
LAUNDRY: WITH SUPERVISION
HYGIENE/GROOMING: INDEPENDENT
DRESS: INDEPENDENT
DRESS: INDEPENDENT

## 2021-11-01 NOTE — PROGRESS NOTES
"Patient sat solitarily in the lounge at the start of the shift, writer approached patient to have his B/P checked at about 0030 but patient declined \"I'm OK.\"  When informed it was important to check the B/P as the last reading on evening was high 162/112 (prior to B/P medication administration), patient says come back later in 20-25 minutes. Will reapproach at 0100. Meanwhile patient is calm and asymptomatic.    Taras Mclean DNP, RN, APRN, CNS, AGCNS-BC.  "

## 2021-11-01 NOTE — PROVIDER NOTIFICATION
11/01/21 1549   Engagement   Intervention Group   Topic Detail SW DBT   Attendance Attended   Patient Response Needs reinforcement/repetition to learn materials   Concentrated on Task duration of group   Cognition Preoccupied   Mood/Affect Anxious;Flat   Social/Behavioral Guarded   Thought Content Minto     GROUP LENGTH (MINS):   45   RELEVANT PRIMARY DIAGNOSIS: Patient Active Problem List   Diagnosis     Psychosis (H)     Undifferentiated schizophrenia (H)        TREATMENT MODALITY:      []CBT       [x]DBT       []ACT       []Interpersonal psychotherapy                                 []Psychoeducational therapy       [x]Skill development       []Other:        ATTENDANCE:        [x]Stayed for entire group     []Arrived late    [] Left early       # OF PATIENTS IN GROUP: 5   BILLABLE:     [x]Yes            []No   Notes:

## 2021-11-01 NOTE — PLAN OF CARE
"    Initial Psychosocial Assessment    Type of CM visit: Initial Assessment, Clinical Treatment Coordinator Role Introduction, Offer Discharge Planning    Information obtained from: [x]Patient   [x]Chart review  [x]Collateral Contacts  []Court Website    Hospitalization information:   Harley Daniel is a 41 year old who was admitted to unit [facility] on 10/30/2021 due to paranoia and delusions.     Patient Self-Assessment  Patient reported reason for admission: \"I'm not sure. Maybe not feeling well a little.\"     Patient reported symptoms of concern: []sadness    []anxiety     []anger    [x]poor sleep     []medications not working    []racing thoughts     []substance use     []agitation     []hearing voices     []hopelessness   [x]Eating concerns    []Self-injury      [] Other   Comments:    Current suicidal ideation:  [x]No    []Yes, no plan     []Yes, with plan (describe):          Comments:   Current homicidal ideation:  [x]No   [] Yes       Comments:     Legal Status at Admission: 72 Hour Hold      History of Mental Health:  Describe current and past mental health symptoms present? Patient reports seeing his PCP who recommended he see a psychiatrist. Patient does not remember the name or clinic for the psychiatrist. Patient reports he has been feeling \"stressed\" but unable to elaborate on symptoms. He does report poor sleep and \"little appetite.\" Per collateral information obtained from patient's cousin, Gerardo, patient has been \"mentally sick\" for 3 - 4 months. Gerardo reports patient is paranoid and thinks his medication and food is being poisoned. He reports patient is restless and would stay up all night pacing in the living room. He also reports patient is not sleeping or eating well. Gerardo reports the patient told him he was scared because someone was after him and was going to hurt him. Gerardo said the patient went to see his PCP for issues with sleep and anxiety and the PCP referred to a psychiatrist at [facility]. When patient went to see the psychiatrist the second visit she called an ambulance to transport patient to the ED.       Do you understand your mental health diagnosis? YES []   NO [x]    History of psychiatric hospitalizations?  YES []     NO  [x]  Details:    If YES, within the last 30 days? YES []     NO  []    History of commitment?  YES []     NO  [x]    Details:   History of ECT?  YES []     NO  [x]    Details:     History of Substance Use Disorder:  Have you used alcohol or substances in the past 12 months? YES []/ NO [x]              If Yes, Type denies Frequency     Would you like a substance use disorder evaluation? YES [] / NO [x]    Previous Treatment? YES []/ NO [x]  Details:     Significant Life Events  (Illness, Death, Loss): None reported      Is there a history of abuse or psychological trauma:    [x]Denies       []Yes, present (type):         []Yes, past (type):        []Patient declined to answer    Identify current stressors:    [x]financial,    []legal issues,    []homelessness,    []housing,     []recent loss,    [x]relationships,    []substance use concerns,    []medical     [x]unemployment     []employment  concerns    []isolation,    []lack of resources,     []out of home placements,     []parenting issues     []domestic violence     []other:  Comments:       Living Situation: Patient reports he used to live with his family in [place], [place]. Most recently has been staying with his cousin, Gerardo. Patient's wife and 4 children are in [place], but return to [place] this month. Youngest child is living with the cousin, Gerardo.     [x]House/apt    []Group Home    []IRTS     []Homeless     []Assisted Living     []Nursing home    []Lives alone    []Lives with :                         []Other:          Family Composition:  with 5 children    Children, ages and current location if minor: 4 children are in [place] currently with their mother, 1 youngest child is staying with patient's cousin  (Gerardo)     Relationship status  []Single     [x]     []     []       []Significant Other   []Other:     Educational Background:  [x]Less Than High School     []High School     []GED     []College       Cognitive/learning concerns: None reported    Financial Status: [] Employed, status and location:  [x]Unemployment    []County Assistance     []SSI/SSDI      []Waivered services    []Other:    Legal status(present):   []Voluntary, [x]72-hour hold, []Commitment, []Guardianship, []Revocation, []Stay of commitment,    Details:    Other legal issues identified:  [x]None, []Arrest,  []Probation/Red Cross,  []Driving under influence,  []Incarceration,  []Sexual offense (level):   []Child Protective Services,      []Other:      Details:    Ethnic/Cultural considerations:  [place]    Spiritual considerations: Worship    [place] Service History:  [x]No     []Yes: details:    Social Functioning (organization, interests) and strengths: Patient reports he enjoys the outdoors. He was unable to identify any strengths at time of intake assessment.    Current Treatment Providers Are:     NO Name, Agency, and phone   Psychiatrist  [] [facility] - unsure of providers name or contact info   Psychotherapist  [x]    [facility] worker  [x]      [x]    Waivered Services  [x]    ACT Teams  [x]    Day Treatment/PHP/EULALIA trtmt  [x]    Group Home/AFC/AL  [x]      [x]    Other:  [] PCP - Alfredo Macias DO    [id number] [facility] [place] [address]    Phone: [phone]    Fax: [phone]      Cousin - Gerardo [phone]           Social Service Assessment of identified patient needs and plan to meet those needs:  Harley Daniel is a 41 year old who was admitted to [facility] on 10/30/2021 due to paranoia and delusions. Patient was guarded, paranoid, and delusional during this intake assessment. Due to his current symptoms patient was unable to provide much  "information and did not answer many questions. [facility] obtained information from chart review and by speaking with patient's cousin, Gerardo [phone]. SONALI in chart. Patient reports seeing his PCP who recommended he see a psychiatrist due to lack of sleep and anxiety. Patient does not remember the name or clinic for the psychiatrist. Patient reports he has been feeling \"stressed\" but unable to elaborate on symptoms. He does report poor sleep and \"little appetite.\" Per collateral information obtained from patient's cousin, Gerardo, patient has been \"mentally sick\" for 3 - 4 months. Gerardo reports patient is paranoid and thinks his medication and food is being poisoned. He reports patient is restless and would stay up all night pacing in the living room. He also reports patient is not sleeping or eating well. Gerardo reports the patient told him he was scared because someone was after him and was going to hurt him. Gerardo said the patient went to see his PCP for issues with sleep and anxiety and the PCP referred to a psychiatrist at [facility]. When patient went to see the psychiatrist the second visit she called an ambulance to transport patient to the ED. Patient is  with 5 children. His wife and 4 of the children have been in [place] for months, but are traveling back to [place] soon. The youngest child is staying with patient's cousin, Gerardo. Patient was also living with his cousin, but Gerardo reports about one week ago patient left and said he was going to a hotel because he \"did not feel safe.\" Last Friday patient called Gerardo stating \"they found me\" and thought someone sprayed poison on his clothes so he washed all of them. Gerardo reports he is not aware of patient ever experiencing any symptoms like this prior to 3-4 months ago. Patient denies any SI or HI. Denies any history of psychiatric hospitalizations. Denies any alcohol or drug use. Patient was working as a  up until 2 months ago when he lost his " job. The cousin reports patient lost his job due to not being able to sleep. No reports of abuse history. Patient is currently on a 72 hour hold. CTC will continue to meet with patient daily for aftercare planning.    CTC will collaborate with interdisciplinary team to develop and implement appropriate plan of care.  Further safety assessment and discharge plan will need to be developed. Collaboration with family may be necessary to ensure safety and verify information provided. Unclear at this time what outpatient provider(s) patient has at [facility] as he is unable to recall names and currently unwilling to sign an SONALI. Patient could benefit from mental health supports such as medication management and psychotherapy if agreeable. Recommendations regarding medication management will be needed from provider.  CTC will make referrals as indicated.      Possible discharge plan: TBD, possibly home with community supports        Barriers: Medication Management, Symptom Stabilization, Coordination of Care

## 2021-11-01 NOTE — PROGRESS NOTES
Patient re approached at exactly 0100 as earlier agreed, before getting to patient, he voluntarily stretched out his left arm. B/P 151/101. HR 74. Patient's affect remains flat, blunted but mood is calm. Patient is still in the lounge area at this time, will continue to cue and encourage patient to go to bed.    Taras Mclean DNP, RN, APRN, CNS, [facility]-BC.

## 2021-11-01 NOTE — PROGRESS NOTES
11/01/21 1541   Engagement   Intervention Group   Topic Detail OT: Nail Care and fuzzy posters for self-care, building self-worth, connecting with others, engaging in goal-directed activity and coping with stress/sxs   Attendance Attended   Concentrated on Task duration of group   Cognition Goal-directed;Distractible   Mood/Affect Anxious;Flat   Social/Behavioral Withdrawn;Guarded   Thought Content Paranoid     Pt was sitting in the lounge and agreed to engage in a coloring activity when supplies were set in front of him; pt worked on project for 45 minutes; worked in a concrete manner, using 3 colors and coloring in block areas; writer observed pt having darting eyes and then hyperfocused on his project.  Writer gave pt an AIDET after group and pt appeared suspicious or paranoid about the handout.  Pt requested to do an interview tomorrow.  Pt became concerned about the interview, asking clarifying questions about who it was with and what their name was.  Writer offered clarification.

## 2021-11-01 NOTE — PLAN OF CARE
Problem: Suicidal Behavior  Goal: Suicidal Behavior is Absent or Managed  Outcome: Improving   Pt attends groups, sits quietly, alone in dining area when not in group. Pt did take medications this shift. Stares, perhaps distracted by unknown stimuli

## 2021-11-01 NOTE — PROGRESS NOTES
"PSYCHIATRY  PROGRESS NOTE     CHIEF COMPLAINT   \"Not feeling great\"       SUBJECTIVE/OBJECTIVE   Patient remains guarded, paranoid and delusional. He has refused most of his medications. He denies hallucinations and denies suicidal or homicidal thoughts. He does not interact with peers or staff and tends to sit alone. He was seen by hospitalist who is managing hypertension and patient did accept his blood pressure medication. He complains of mild abdominal pain and blurry vision. He is having no agitation presently. He has no insight into reason for admission.         MEDICATIONS   Medications:  Scheduled Meds:    amLODIPine  5 mg Oral Daily     ARIPiprazole  5 mg Oral Daily     busPIRone  10 mg Oral TID     FLUoxetine  20 mg Oral Daily     Continuous Infusions:  PRN Meds:.acetaminophen, alum & mag hydroxide-simethicone, cloNIDine, hydrOXYzine, traZODone    Medication adherence issues: MS Med Adherence Y/N: Yes, Not confident in efficacy of medication  Medication side effects: MEDICATION SIDE EFFECTS: no side effects reported  Benefit: Yes / No: Yes       ROS   A comprehensive review of systems was negative. Except noted in HPI       MENTAL STATUS EXAM   Vitals: BP (!) 151/101 (BP Location: Left arm, Patient Position: Sitting)   Pulse 74   Temp 97.8  F (36.6  C) (Oral)   Resp 16   Ht 1.778 m (5' 10\")   Wt 73.1 kg (161 lb 3.2 oz)   SpO2 98%   BMI 23.13 kg/m      Appearance:  Casually groomed  Mood:  depressed  Affect: flat  Suicidal Ideation: denies  Homicidal Ideation:denies  Thought process:blocked, guarded  Thought content: guarded, paranoid and delusional  Fund of Knowledge: Average  Attention/Concentration: Easily distracted  Language ability:  Intact  Memory:  Immediate recall intact, Short-term memory intact and Long-term memory impaired  Insight:  poor.  Judgement: fair  Orientation: Yes, x4  Psychomotor Behavior: normal  Muscle Strength and Tone: MuscleStrength: Normal  Gait and Station: Normal   "     LABS   personally reviewed.     No results found for: PHENYTOIN, PHENOBARB, VALPROATE, CBMZ       DIAGNOSIS   Principal Problem:    Undifferentiated schizophrenia (H)    Active Problem List:  Patient Active Problem List   Diagnosis     Psychosis (H)     Undifferentiated schizophrenia (H)          PLAN   1. Ongoing education given regarding diagnostic and treatment options with risks, benefits and alternatives and adequate verbalization of understanding.    2. Medications: Abilify increased to 10 mg daily                           Buspar 10 mg TID--hold for now                           Prozac 20 mg daily    3. Hospitalist consult: Hospitalist to see patient as needed. Patient's BP is currently elevated,Consult placed to the hospitalist,  hospitalist is aware and following patient     4. Legal: 72 hour hold. Will consider whether to file for commitment by tomorrow based on patient willingness to take medications/stay voluntary    5.  to follow regarding collecting and reviewing collateral information, referrals and disposition planning.      Risk Assessment: [facility] RISK ASSESSMENT: Patient able to contract for safety    Coordination of Care:   Treatment Plan reviewed and physician signed, Care discussed with Care/Treatment Team Members, Chart reviewed and Patient seen    Patient seen, chart reviewed, case reviewed with  and with nursing.   Case reviewed in multi-disciplinary treatment team.  More than 35 minutes spent on this visit with more than 50% time spent on coordination of care with staff, reviewing medical record, psychoeducation, providing supportive therapy regarding coping with chronic mental illness, entering orders and preparing documentation for the visit, and commitment paperwork    Patel Justin MD    Re-Certification I certify that the inpatient psychiatric facility services furnished since the previous certification were, and continue to be, medically  necessary for, either, treatment which could reasonably be expected to improve the patient s condition or diagnostic study and that the hospital records indicate that the services furnished were, either, intensive treatment services, admission and related services necessary for diagnostic study, or equivalent services.     I certify that the patient continues to need, on a daily basis, active treatment furnished directly by or requiring the supervision of inpatient psychiatric facility personnel.   I estimate 3-5 days of hospitalization is necessary for proper treatment of the patient. My plans for post-hospital care for this patient are  home with family     Patel Justin MD

## 2021-11-01 NOTE — PLAN OF CARE
Problem: Behavioral Health Plan of Care  Goal: Patient-Specific Goal (Individualization)  Outcome: No Change  Note: Shift Summery:  Pt continues to refuse medications and paranoia continues, with pt not eating and little drinking.    Patient's Stated Goal for Shift:  none  Not met. Pt did take Clonidine for BP once.     Problem: Behavioral Health Plan of Care  Goal: Patient-Specific Goal (Individualization)  Outcome: No Change  Note: Shift Summery:  Pt continues to refuse medications and paranoia continues, with pt not eating and little drinking.    Patient's Stated Goal for Shift:  none  Not met. Pt did take Clonidine for BP once.

## 2021-11-01 NOTE — PROGRESS NOTES
Patient approached at about 0135 and encouraged to go to bed so he could be up fresh and not tired in the morning and patient accepted, went to bed at about 0145 but started sleeping at about 0230. Still sleeping at this time.    Taras Mclean DNP, RN, APRN, CNS, [facility]-BC.

## 2021-11-01 NOTE — PLAN OF CARE
BEHAVIORAL TEAM DISCUSSION    Participants: Filomena BoykinRN, Mami STEVEN -CTC, Christen ART -OT, Lisa ART -Pharmacy, Dr Adams - Psychology, Dr Justin -Psychiatry  Progress: Improving  Anticipated length of stay: TBD  Continued Stay Criteria/Rationale: symptom stabilization, medication management, coordination of care  Medical/Physical: monitor blood pressure  Precautions:   Behavioral Orders   Procedures    Code 1 - Restrict to Unit    Routine Programming     As clinically indicated    Status 15     Every 15 minutes.    Suicide precautions     Patients on Suicide Precautions should have a Combination Diet ordered that includes a Diet selection(s) AND a Behavioral Tray selection for Safe Tray - with utensils, or Safe Tray - NO utensils       Plan: home with community supports  Rationale for change in precautions or plan: no change

## 2021-11-01 NOTE — PROVIDER NOTIFICATION
11/01/21 1500   Engagement   Intervention Group   Topic Coping with stress;Symptom management   Topic Detail   (Process Group: Managing Anxiety)   Attendance Attended   Patient Response Needs reinforcement/repetition to learn materials   Concentrated on Task duration of group   Cognition Goal-directed   Mood/Affect Content   Social/Behavioral Cooperative;Engaged   Thought Content [place]     GROUP LENGTH (MINS):   35 mins.   RELEVANT PRIMARY DIAGNOSIS: Patient Active Problem List   Diagnosis     Psychosis (H)     Undifferentiated schizophrenia (H)        TREATMENT MODALITY:      []CBT       []DBT       []ACT       []Interpersonal psychotherapy                                 [x]Psychoeducational therapy       [x]Skill development       []Other:        ATTENDANCE:        [x]Stayed for entire group     []Arrived late    [] Left early       # OF PATIENTS IN GROUP: 4   BILLABLE:     [x]Yes            []No   Notes:

## 2021-11-01 NOTE — PLAN OF CARE
Problem: Behavioral Health Plan of Care  Goal: Absence of New-Onset Illness or Injury  Outcome: Improving     Problem: Suicidal Behavior  Goal: Suicidal Behavior is Absent or Managed  Outcome: Improving  Flowsheets (Taken 11/1/2021 0054)  Mutually Determined Action Steps (Suicidal Behavior Absent/Managed): other (see comments)  Note: Patient remains calm in the lounge, denies SI at this time.   Patient was in the lounge area at the start of the shift, Mood calm, affect flat, blunted. Patient was approached to have his B/P checked as it was high at bedtime, initially refused but allowed a check after 25 minutes (0100) 151/101(asymptomatic),  HR 74. Patient declined to go to bed until about 0145 and started sleeping at about 0230, patient sleeps in bed this night (slept on the floor in his room yesternight). Patient is still sleeping at this time, denies SI/HI, A/VH or SIB. Before going to bed, patient stares vaguely into space, seems preoccupied but not responding to internal stimuli and no self talk. Patient was calm with no aggressive behavior. Patient had about 5 hours of sleep. Will continue to monitor and follow plan of care.    Taras Mclean, MARÍA, RN, APRN, CNS, AGCNS-BC.

## 2021-11-01 NOTE — PLAN OF CARE
"  Problem: Behavioral Health Plan of Care  Goal: Plan of Care Review  Outcome: No Change  Flowsheets (Taken 11/1/2021 0910 by Serafin Montanez, RN)  Plan of Care Reviewed With: patient  Patient Agreement with Plan of Care: disagrees (describe)     Problem: Suicidal Behavior  Goal: Suicidal Behavior is Absent or Managed  Outcome: No Change  Flowsheets (Taken 11/1/2021 0054 by Taras Mclean, RN)  Mutually Determined Action Steps (Suicidal Behavior Absent/Managed): other (see comments)     Patient had no c/o pain, anxiety, depression and denies SI/HI and all other psych symptoms this shift.  Out on the unit sitting alone, antisocial, attended group, asked questions and participated.  Patient c/o not sleeping well last night due to frequent checks. Patient stated \"I don't think I will be here tonight, I will be leaving.\" Writer assured patient's safety and explained the need for the frequent checks. Patient is paranoid that two of the male peers and expressed \"they are planning to take me somewhere tonight. I don't trust them.\" Writer explained to patient that they're all patients and staff are here to keep them safe and nothing is going to happen to him the rest of his stay. Patient refused his Abilify and the Trazodone he had requested at .  Safety checks completed as ordered.  Appeared to be comfortable and stable this shift, will continue to monitor.                "

## 2021-11-02 PROCEDURE — 128N000001 HC R&B CD/MH ADULT

## 2021-11-02 PROCEDURE — 250N000013 HC RX MED GY IP 250 OP 250 PS 637: Performed by: INTERNAL MEDICINE

## 2021-11-02 PROCEDURE — 250N000013 HC RX MED GY IP 250 OP 250 PS 637: Performed by: PSYCHIATRY & NEUROLOGY

## 2021-11-02 PROCEDURE — 124N000001 HC R&B MH

## 2021-11-02 PROCEDURE — 99231 SBSQ HOSP IP/OBS SF/LOW 25: CPT | Performed by: PSYCHIATRY & NEUROLOGY

## 2021-11-02 PROCEDURE — 90853 GROUP PSYCHOTHERAPY: CPT

## 2021-11-02 PROCEDURE — 250N000013 HC RX MED GY IP 250 OP 250 PS 637: Performed by: NURSE PRACTITIONER

## 2021-11-02 RX ORDER — DOCUSATE SODIUM 100 MG/1
100 CAPSULE, LIQUID FILLED ORAL 2 TIMES DAILY PRN
Status: DISCONTINUED | OUTPATIENT
Start: 2021-11-02 | End: 2021-11-11 | Stop reason: HOSPADM

## 2021-11-02 RX ORDER — ARIPIPRAZOLE 10 MG/1
10 TABLET ORAL DAILY
Status: DISCONTINUED | OUTPATIENT
Start: 2021-11-03 | End: 2021-11-02

## 2021-11-02 RX ORDER — ARIPIPRAZOLE 10 MG/1
10 TABLET ORAL DAILY
Status: DISCONTINUED | OUTPATIENT
Start: 2021-11-02 | End: 2021-11-03

## 2021-11-02 RX ADMIN — ACETAMINOPHEN 650 MG: 325 TABLET ORAL at 19:02

## 2021-11-02 RX ADMIN — ARIPIPRAZOLE 10 MG: 10 TABLET ORAL at 08:42

## 2021-11-02 RX ADMIN — CLONIDINE HYDROCHLORIDE 0.1 MG: 0.1 TABLET ORAL at 19:04

## 2021-11-02 RX ADMIN — FLUOXETINE HYDROCHLORIDE 20 MG: 20 CAPSULE ORAL at 08:44

## 2021-11-02 RX ADMIN — HYDROXYZINE HYDROCHLORIDE 25 MG: 25 TABLET, FILM COATED ORAL at 20:53

## 2021-11-02 RX ADMIN — TRAZODONE HYDROCHLORIDE 50 MG: 50 TABLET ORAL at 20:53

## 2021-11-02 RX ADMIN — AMLODIPINE BESYLATE 5 MG: 5 TABLET ORAL at 08:44

## 2021-11-02 ASSESSMENT — MIFFLIN-ST. JEOR: SCORE: 1641.09

## 2021-11-02 ASSESSMENT — ACTIVITIES OF DAILY LIVING (ADL)
HYGIENE/GROOMING: INDEPENDENT
HYGIENE/GROOMING: SHOWER

## 2021-11-02 NOTE — PLAN OF CARE
"  Problem: Behavioral Health Plan of Care  Goal: Plan of Care Review  Outcome: Improving talking to staff more  Goal: Adheres to Safety Considerations for Self and Others  Outcome: Improving. Stated, \" I would never hurt others    Pt alert and oriented X0. Thought process was linear and organized. Speech is appropriate and clear. Patient's verbal reports match affect and behavior.    Pain 0/10, anxiety 8/10, Depression 0/10, Denies SI and HI, Denies A/VH. Contracts for safety.   Appetite is better eating 75% of meals. Bowels are moving normally. VSS.    Pt was observed calm and in lounge for the majority of the shift. Pt did notparticipate in groups but attended. Pt had no meds this shift. Pt sleeps well.   No other behaviors noted this shift.  Pt remains on suicide precautions for safety.   Pt stated he hadn't urinated since last night at 8pm and that he couldn't pee when he tried.  Then as directed, he went to his room and peed and was bladder scanned for 0 mls three times.  He stated he hadn't had a a bowel movement for 14 days, but belly is soft.  Dr oconnell order prn colace BID prn.  One dose given.     Uses no ambulatory aids.      Vangie Ordonez RN, CMSRN  [facility]ental [facility]          "

## 2021-11-02 NOTE — PROGRESS NOTES
"   11/02/21 1521   Engagement   Intervention Group   Topic Detail OT: FIMO beading and sensory/grounding discussion for sensory education, sensory exploration, attention/planning/sequencing, creative expression and building self-worth   Attendance Attended   Patient Response No evidence of learning   Concentrated on Task duration of group   Cognition Confused   Mood/Affect Flat   Social/Behavioral Cooperative;Disengaged     Pt attended group; appeared confused about question regarding what is \"grounding\" or \"calming\" for him; writer asked pt \"what helps you feel good?\" and pt responded with \"being with my family.\"  Pt opted out of group activity and chose to observe peers during group; pt appeared to think he needed to remain in group as he did not excuse himself until the end of group when writer excused everyone.   "

## 2021-11-02 NOTE — PLAN OF CARE
Problem: Behavioral Health Plan of Care  Goal: Develops/Participates in Therapeutic [place] to Support Successful Transition  Outcome: Improving  Pt took all am medications, including increased Abilify. Pt drinking warm water and some food and attends groups, but is quiet in groups. Less staring noted. Pt did make phone calls and it appeard to go well for him.

## 2021-11-02 NOTE — PLAN OF CARE
Problem: Suicidal Behavior  Goal: Suicidal Behavior is Absent or Managed  Outcome: Improving  Note: Patient is sleeping at this time, no c/o of SI     Problem: Behavioral Health Plan of Care  Goal: Adheres to Safety Considerations for Self and Others  Outcome: Improving  Goal: Absence of New-Onset Illness or Injury  Outcome: Improving     Problem: Suicidal Behavior  Goal: Suicidal Behavior is Absent or Managed  Outcome: Improving  Note: Patient is sleeping at this time, no c/o of SI     Problem: Behavioral Health Plan of Care  Goal: Adheres to Safety Considerations for Self and Others  Outcome: Improving     Problem: Behavioral Health Plan of Care  Goal: Absence of New-Onset Illness or Injury  Outcome: Improving     Problem: Suicidal Behavior  Goal: Suicidal Behavior is Absent or Managed  Outcome: Improving  Note: Patient is sleeping at this time, no c/o of SI     Patient was observed sleeping in bed in his room right from the start of the shift, slept through the night without coming out or request for anything. Patient was cooperative with safety checks, no behavior manifested this night. Patient did not endorse SI/HI or SIB, neither was he observed responding to internal stimuli. Will continue to monitor and follow plan of care.    Taras Mclean, DNP, RN, APRN, CNS, AGCNS-BC.

## 2021-11-02 NOTE — PROGRESS NOTES
"   11/02/21 1100   Occupational Therapy Psychosocial Assessment   Assessment Type Chart Review   Unable to assess Patient refused interview   Reason for refusal Elevated Symptoms   Prior Level of Function   People in home other relative(s)  (Lives with cousin)   Therapy Assessment   Patient Presentation Patient sitting in the lounge area on approach. Patient appeared to be wearing clean clothes and well groomed. On approach patient appeared blunted, guarded, and semi-paranoid. Patient declined to complete interview with therapist but agreed to fill out the OT questionnaire. Patient asked therapist to return in \"10 minutes\" and stated he would leave the completed OT questionnaire on the table. Therapist returned 10 minutes later and found the questionnaire on the table with two responses completed. Therapist approached patient to complete interview and patient declined. Per chart review: Grant Lutz DO (ER)10/29/21   Harley Daniel is a 41 year old male who presents for a psychiatric evaluation. The patient comes to the ED after an appointment with his psychiatrist who placed him on a hold. He is joined by his cousin who he has been living with. His cousin says that for the last four days, he has been living out of hotels and his car, and that for the past two months, he has been acting strangely. He has been sleeping very little, is paranoid thinking that he and his children are in danger, and for the past two weeks, has not been taking his prescription medications. The patient denies any suicidal ideation, homicidal ideation, drug, or alcohol use; the patient's cousin is in agreement.    Patel Justin MD  11/1/21  Patient remains guarded, paranoid and delusional. He has refused most of his medications. He denies hallucinations and denies suicidal or homicidal thoughts. He does not interact with peers or staff and tends to sit alone. He was seen by hospitalist who is managing hypertension " and patient did accept his blood pressure medication. He complains of mild abdominal pain and blurry vision. He is having no agitation presently. He has no insight into reason for admission. agitation presently. He has no insight into reason for admission.   Patient-identified areas of success Time spent with family or friends   Patient-identified areas of difficulty Difficulty concentrating   Clinical Impression   Beh OT Plan for Next Session Enage in at least 1 OT group daily in a reality-based manner and free of paranoia.

## 2021-11-02 NOTE — PROGRESS NOTES
11/02/21 1033   Engagement   Intervention Group   Topic Detail OT: Chair Yoga for total body wellness, building mind-body awareness, grounding, sensory modulation and coping with stress/sxs   Attendance Attended   Patient Response Needs reinforcement/repetition to learn materials;Was respectful   Concentrated on Task duration of group   Cognition Goal-directed;Confused   Mood/Affect Blunted   Social/Behavioral Disengaged     Pt was engaged in activity for duration but struggled to engage in mirroring of bilateral movements; thought content and motor processing appear disorganized; pt remained calm and did not appear discouraged.

## 2021-11-02 NOTE — PLAN OF CARE
"Assessment/Intervention, Care Coordination and Current Symptoms:   [facility] consulted with psychiatrist and met with patient. He was sitting in the lounge upon approach and agreeable to met. Patient presented as guarded and paranoid during this interaction. He reports feeling \"something bigger than my head is going on\" and \"higher security are watching and they will find me.\"  Patient reports he slept last night for the first time in awhile despite there being a lot of noise on the unit. Denies any SI/HI. Patient reports he is willing to stay in the hospital, but is unsure if this is a \"real hospital\" or where he is. [facility] tried to help orient patient to the location of the hospital by showing him landmarks outside of the windows but he reports feeling unsure this is [facility]. [facility] will continue to meet with patient for aftercare planning and consult with the treatment team.    [facility] spoke with patient's cousin, Gerardo [phone], as he requested an update on patient. Gerardo reports patient's wife is returning from [place] tomorrow and is hoping to contact the patient. [facility] informed Gerardo there is no SONALI for patient's wife currently. [facility] provided information for how she can contact patient on the unit. SONALI for Gerardo in chart.         Discharge Plan or Goal:  Home with community supports        Barriers to Discharge:  symptom stabilization, medication management, coordination of care        Referral Status:    None at this time due to patient being unwilling to sign ROIs       Legal Status:  Voluntary     Mami Olson [facility], 11/2/2021, 2:58 PM      "

## 2021-11-02 NOTE — PROGRESS NOTES
"PSYCHIATRY  PROGRESS NOTE     CHIEF COMPLAINT   \"Not feeling great\"       SUBJECTIVE/OBJECTIVE     Patient is still guarded and paranoid and somewhat scattered and distracted. He has improved somewhat however and appears less paranoid compared to yesterday with some insight. He reluctantly will take Abilify. He is willing to stay voluntary. He has no particular physical complaints. He is tolerating Abilify         MEDICATIONS   Medications:  Scheduled Meds:    amLODIPine  5 mg Oral Daily     ARIPiprazole  10 mg Oral Daily     [Held by provider] busPIRone  10 mg Oral TID     FLUoxetine  20 mg Oral Daily     Continuous Infusions:  PRN Meds:.acetaminophen, alum & mag hydroxide-simethicone, cloNIDine, hydrOXYzine, traZODone    Medication adherence issues: MS Med Adherence Y/N: Yes, Not confident in efficacy of medication  Medication side effects: MEDICATION SIDE EFFECTS: no side effects reported  Benefit: Yes / No: Yes       ROS   Review of Systems is otherwise negative including HEENT, CV, Respiratory, GI, , Musculoskeletal, Neurologic, Dermatologic, Endocrine, Immunological, Constitutional systems         MENTAL STATUS EXAM   Vitals: BP (!) 136/92   Pulse 83   Temp 98.3  F (36.8  C)   Resp 16   Ht 1.778 m (5' 10\")   Wt 73 kg (160 lb 14.4 oz)   SpO2 99%   BMI 23.09 kg/m      Appearance:  Casually groomed  Mood:  depressed  Affect: flat  Suicidal Ideation: denies  Homicidal Ideation:denies  Thought process:blocked, guarded  Thought content: paranoia and delusions persist but some improvement  Fund of Knowledge: Average  Attention/Concentration: Easily distracted  Language ability:  Intact  Memory:  Immediate recall intact, Short-term memory intact and Long-term memory impaired  Insight:  poor.  Judgement: fair  Orientation: Yes, x4  Psychomotor Behavior: normal  Muscle Strength and Tone: MuscleStrength: Normal  Gait and Station: Normal       LABS   personally reviewed.     No results found for: PHENYTOIN, " PHENOBARB, VALPROATE, CBMZ       DIAGNOSIS   Principal Problem:    Undifferentiated schizophrenia (H)    Active Problem List:  Patient Active Problem List   Diagnosis     Psychosis (H)     Undifferentiated schizophrenia (H)          PLAN   1. Ongoing education given regarding diagnostic and treatment options with risks, benefits and alternatives and adequate verbalization of understanding.    2. Medications: Abilify increased to 10 mg daily                           Buspar 10 mg TID--hold for now                           Prozac 20 mg daily    3. Hospitalist consult: Hospitalist to see patient as needed. Patient's BP is currently elevated,Consult placed to the hospitalist,  hospitalist is aware and following patient     4. Legal: Will switch to voluntary as patient agrees to stay on unit and take Abilify    5.  to follow regarding collecting and reviewing collateral information, referrals and disposition planning.      Risk Assessment: [facility] RISK ASSESSMENT: Patient able to contract for safety    Coordination of Care:   Treatment Plan reviewed and physician signed, Care discussed with Care/Treatment Team Members, Chart reviewed and Patient seen    Patient seen, chart reviewed, case reviewed with  and with nursing.       Patel Justin MD    Re-Certification I certify that the inpatient psychiatric facility services furnished since the previous certification were, and continue to be, medically necessary for, either, treatment which could reasonably be expected to improve the patient s condition or diagnostic study and that the hospital records indicate that the services furnished were, either, intensive treatment services, admission and related services necessary for diagnostic study, or equivalent services.     I certify that the patient continues to need, on a daily basis, active treatment furnished directly by or requiring the supervision of inpatient psychiatric facility  personnel.   I estimate 3-5 days of hospitalization is necessary for proper treatment of the patient. My plans for post-hospital care for this patient are  home with family     Patel Justin MD

## 2021-11-03 PROCEDURE — 90853 GROUP PSYCHOTHERAPY: CPT

## 2021-11-03 PROCEDURE — 124N000001 HC R&B MH

## 2021-11-03 PROCEDURE — 250N000013 HC RX MED GY IP 250 OP 250 PS 637: Performed by: NURSE PRACTITIONER

## 2021-11-03 PROCEDURE — 250N000013 HC RX MED GY IP 250 OP 250 PS 637: Performed by: INTERNAL MEDICINE

## 2021-11-03 PROCEDURE — 250N000013 HC RX MED GY IP 250 OP 250 PS 637: Performed by: PSYCHIATRY & NEUROLOGY

## 2021-11-03 PROCEDURE — 99231 SBSQ HOSP IP/OBS SF/LOW 25: CPT | Performed by: PSYCHIATRY & NEUROLOGY

## 2021-11-03 PROCEDURE — 128N000001 HC R&B CD/MH ADULT

## 2021-11-03 RX ORDER — ARIPIPRAZOLE 15 MG/1
15 TABLET ORAL DAILY
Status: DISCONTINUED | OUTPATIENT
Start: 2021-11-04 | End: 2021-11-11 | Stop reason: HOSPADM

## 2021-11-03 RX ADMIN — AMLODIPINE BESYLATE 5 MG: 5 TABLET ORAL at 09:27

## 2021-11-03 RX ADMIN — DOCUSATE SODIUM 100 MG: 100 CAPSULE, LIQUID FILLED ORAL at 09:28

## 2021-11-03 RX ADMIN — ARIPIPRAZOLE 10 MG: 10 TABLET ORAL at 09:28

## 2021-11-03 RX ADMIN — FLUOXETINE HYDROCHLORIDE 20 MG: 20 CAPSULE ORAL at 09:28

## 2021-11-03 ASSESSMENT — ACTIVITIES OF DAILY LIVING (ADL)
LAUNDRY: WITH SUPERVISION
HYGIENE/GROOMING: INDEPENDENT
DRESS: INDEPENDENT
HYGIENE/GROOMING: INDEPENDENT
ORAL_HYGIENE: INDEPENDENT

## 2021-11-03 NOTE — PROGRESS NOTES
"PSYCHIATRY  PROGRESS NOTE     CHIEF COMPLAINT   \"Not feeling great\"       SUBJECTIVE/OBJECTIVE     Patient remains guarded and paranoid. He has very limited insight. He does take Abilfy with encouragement from nursing. Nursing feels that he is improving. He does not engage with staff or peers or participate in groups. He denies hallucinations and has no physical complaints.         MEDICATIONS   Medications:  Scheduled Meds:    amLODIPine  5 mg Oral Daily     [START ON 11/4/2021] ARIPiprazole  15 mg Oral Daily     [Held by provider] busPIRone  10 mg Oral TID     FLUoxetine  20 mg Oral Daily     Continuous Infusions:  PRN Meds:.acetaminophen, alum & mag hydroxide-simethicone, cloNIDine, docusate sodium, hydrOXYzine, traZODone    Medication adherence issues: MS Med Adherence Y/N: Yes, Not confident in efficacy of medication  Medication side effects: MEDICATION SIDE EFFECTS: no side effects reported  Benefit: Yes / No: Yes       ROS   Review of Systems is otherwise negative including HEENT, CV, Respiratory, GI, , Musculoskeletal, Neurologic, Dermatologic, Endocrine, Immunological, Constitutional systems       MENTAL STATUS EXAM   Vitals: BP (!) 134/100 (BP Location: Right arm)   Pulse 96   Temp 98.3  F (36.8  C)   Resp 18   Ht 1.778 m (5' 10\")   Wt 73 kg (160 lb 14.4 oz)   SpO2 99%   BMI 23.09 kg/m      Appearance:  Casually groomed  Mood:  depressed  Affect: flat  Suicidal Ideation: denies  Homicidal Ideation:denies  Thought process:blocked, guarded  Thought content: paranoia persists but improving  Fund of Knowledge: Average  Attention/Concentration: Easily distracted  Language ability:  Intact  Memory:  Immediate recall intact, Short-term memory intact and Long-term memory impaired  Insight:  poor.  Judgement: fair  Orientation: Yes, x4  Psychomotor Behavior: normal  Muscle Strength and Tone: MuscleStrength: Normal  Gait and Station: Normal    Gradual improvement over past 2 days     LABS   personally " reviewed.     No results found for: PHENYTOIN, PHENOBARB, VALPROATE, CBMZ       DIAGNOSIS   Principal Problem:    Undifferentiated schizophrenia (H)    Active Problem List:  Patient Active Problem List   Diagnosis     Psychosis (H)     Undifferentiated schizophrenia (H)          PLAN   1. Ongoing education given regarding diagnostic and treatment options with risks, benefits and alternatives and adequate verbalization of understanding.    2. Medications: Abilify increased to 15 mg daily                           Buspar 10 mg TID--hold for now                           Prozac 20 mg daily    3. Hospitalist consult: Hospitalist to see patient as needed. Patient's BP is currently elevated,Consult placed to the hospitalist,  hospitalist is aware and following patient     4. Legal: Switched to voluntary as patient agrees to stay on unit and take Abilify    5.  to follow regarding collecting and reviewing collateral information, referrals and disposition planning.    No further change in treatment plan      Risk Assessment: [facility] RISK ASSESSMENT: Patient able to contract for safety    Coordination of Care:   Treatment Plan reviewed and physician signed, Care discussed with Care/Treatment Team Members, Chart reviewed and Patient seen    Patient seen, chart reviewed, case reviewed with  and with nursing.   '    Patel Justin MD    Re-Certification I certify that the inpatient psychiatric facility services furnished since the previous certification were, and continue to be, medically necessary for, either, treatment which could reasonably be expected to improve the patient s condition or diagnostic study and that the hospital records indicate that the services furnished were, either, intensive treatment services, admission and related services necessary for diagnostic study, or equivalent services.     I certify that the patient continues to need, on a daily basis, active treatment  furnished directly by or requiring the supervision of inpatient psychiatric facility personnel.   I estimate 4 days of hospitalization is necessary for proper treatment of the patient. My plans for post-hospital care for this patient are  home with family     Patel Justin MD

## 2021-11-03 NOTE — PROGRESS NOTES
"   11/03/21 1525   Engagement   Intervention Group   Topic Detail OT: Zaid huertaing Day 2 for planning/sequencing/attention/problem-solving, creative expression, building sense of self-worth and coping with stress/sxs   Attendance Did not attend   Reason for Not Attending Refused     PT came down to the group room to see what the activity was.  The room was full but pt was told that space could be made but was also reminded that group was voluntary.   Pt appeared surprised that group was not mandatory.  Pt repeated multiple times that he would not attend because of how many peers there were and because of \"germs\".  Pt struggled with terminating the conversation, repeating himself multiple times and continuing to  the doorway.  Writer had to walk away from patient to cue him that the conversation was over.   "

## 2021-11-03 NOTE — PLAN OF CARE
"Assessment/Intervention, Care Coordination and Current Symptoms:   [facility] consulted with psychiatrist and met with patient. He was sitting alone in the [facility] area and agreed to meet with CTC in the conference room. Patient presented as guarded and paranoid during this interaction. Patient denied any depression and reports his anxiety is \"so-so.\" Denied any SI or HI. He reports thinking someone is trying to harm him or his children, but feels confused and unable to understand who it is. CTC tried to discuss aftercare planning with patient, but he was unwilling to sign ROIs at this time. Patient currently states he does not want to go back to [place]. [facility] assisted patient in trying to call his wife, Arron [phone], who he believes has traveled back from [facility] with their children. Arron did not answer and patient did not want to leave a voicemail. He reports he has not spoken to his wife in approximately 2 weeks and is unsure of her travel plans at this time. Patient was willing to sign an SONALI for his wife so CTC will attempt to contact her.     Later in the afternoon CTC assisted patient with trying to call his wife, Arron, again. This time patient was able to speak with her. They spoke in another language so CTC could not understand the conversation. Patient reported she is at the airport in [place] with their children traveling home.  He reported concern that was not his wife on the phone, but rather someone impersonating her. [facility] attempted to reassure patient as he provided his wife's phone number to be dialed, but he says \"people are not who they say they are.\"      Discharge Plan or Goal:  Home with community supports        Barriers to Discharge:  symptom stabilization, medication management, coordination of care        Referral Status:    None at this time due to patient being unwilling to sign ROIs        Legal Status:  Voluntary     Mami Olson, [facility], 11/3/2021, 3:05 PM    "

## 2021-11-03 NOTE — PROGRESS NOTES
"   11/03/21 1039   Engagement   Intervention Group   Topic Detail OT: Cognitive game sampler ([employer]) for attention/categorization/recall/idea generation, social engagement, and coping with stress/sxs   Attendance Other (Comment)   Patient Response No evidence of learning;Was respectful   Concentrated on Task 15 - 30 min   Cognition Confused   Mood/Affect Blunted;Content   Social/Behavioral Disengaged     Writer talked with patient before group and pt shared that he attends group for \"socialization\" but struggled to ID if groups were helpful; pt remained in group area once OT group began; pt engaged in check-in in a very quiet manner; pt struggled to understand the rules of the game and despite max assist and was unable to engage; pt eventually skipped his turns but remained in the group area; pt appeared to be in thought and was not attending to group activity despite being in group area.   "

## 2021-11-03 NOTE — PLAN OF CARE
Problem: Behavioral Health Plan of Care  Goal: Adheres to Safety Considerations for Self and Others  Outcome: Improving     Problem: Behavioral Health Plan of Care  Goal: Absence of New-Onset Illness or Injury  Outcome: Improving    Safety checks executed per routine. Safety--suicide precautions in place. No behavior issues this shift. Patient resting quietly with eyes closed, respirations noted even, and unlabored on all safety checks at [facility]. No indication to staff of being awake.  No c/o pain distress/discomfort.

## 2021-11-03 NOTE — PROGRESS NOTES
Nurse sat with pt for 30 minutes to talk about his improved status of eating and his going to groups.Nurse reinforced this is probably because he is taking the medications. Nurse allowed pt to look at pkgs and given explanation what each pill was for, and pt did finally open them and take them, with suspicion while taking them. Nurse reinforced the 4 pills are the same medications that he will go home on.

## 2021-11-03 NOTE — PROGRESS NOTES
Pt took all medications over longer period with nurse explaining reason for each med this am. Pt was allowed to hold pkgs and open on his own and pt did take all am meds. Pt smiling more and not staring as much and going to all groups. Pt eating well and encouraged to drink more warm water, which is pt's preference. Pt C/O constipation and left message for Dr. Justin.

## 2021-11-03 NOTE — PROVIDER NOTIFICATION
11/03/21 1400   Engagement   Intervention Group   Topic Insight development/self-awareness   Topic Detail CBT [place]   Attendance Attended   Patient Response Expressed feelings/issues;Was respectful   Concentrated on Task duration of group   Cognition Goal-directed   Mood/Affect Flat   Social/Behavioral Cooperative;Engaged   Thought Content Lacks insight/judgement     GROUP LENGTH (MINS):   40 minutes   RELEVANT PRIMARY DIAGNOSIS: Patient Active Problem List   Diagnosis     Psychosis (H)     Undifferentiated schizophrenia (H)        TREATMENT MODALITY:      [x]CBT       []DBT       []ACT       []Interpersonal psychotherapy                                 []Psychoeducational therapy       []Skill development       []Other:        ATTENDANCE:        []Stayed for entire group     [x]Arrived late    [] Left early       # OF PATIENTS IN GROUP: 4   BILLABLE:     [x]Yes            []No   Notes:

## 2021-11-03 NOTE — PROVIDER NOTIFICATION
11/03/21 1200   Engagement   Intervention Group   Topic Coping with stress   Topic Detail Coping with Stress   Attendance Other (Comment)  (arrived late)   Patient Response Was respectful   Concentrated on Task 15 - 30 min   Cognition Confused   Mood/Affect Blunted   Social/Behavioral Cooperative     GROUP LENGTH (MINS):   45 min   RELEVANT PRIMARY DIAGNOSIS: Patient Active Problem List   Diagnosis     Psychosis (H)     Undifferentiated schizophrenia (H)        TREATMENT MODALITY:      [x]CBT       []DBT       []ACT       []Interpersonal psychotherapy                                 []Psychoeducational therapy       []Skill development       []Other:        ATTENDANCE:        []Stayed for entire group     [x]Arrived late    [] Left early       # OF PATIENTS IN GROUP: 5   BILLABLE:     []Yes            [x]No   Notes:   Arrived 20 min late

## 2021-11-04 PROCEDURE — 99232 SBSQ HOSP IP/OBS MODERATE 35: CPT | Performed by: PSYCHIATRY & NEUROLOGY

## 2021-11-04 PROCEDURE — 250N000013 HC RX MED GY IP 250 OP 250 PS 637: Performed by: PSYCHIATRY & NEUROLOGY

## 2021-11-04 PROCEDURE — 128N000001 HC R&B CD/MH ADULT

## 2021-11-04 PROCEDURE — 124N000001 HC R&B MH

## 2021-11-04 PROCEDURE — 250N000013 HC RX MED GY IP 250 OP 250 PS 637: Performed by: NURSE PRACTITIONER

## 2021-11-04 PROCEDURE — 250N000013 HC RX MED GY IP 250 OP 250 PS 637: Performed by: INTERNAL MEDICINE

## 2021-11-04 RX ORDER — AMOXICILLIN 250 MG
1 CAPSULE ORAL DAILY PRN
Status: DISCONTINUED | OUTPATIENT
Start: 2021-11-04 | End: 2021-11-11 | Stop reason: HOSPADM

## 2021-11-04 RX ADMIN — ARIPIPRAZOLE 15 MG: 15 TABLET ORAL at 08:53

## 2021-11-04 RX ADMIN — FLUOXETINE HYDROCHLORIDE 20 MG: 20 CAPSULE ORAL at 08:53

## 2021-11-04 RX ADMIN — AMLODIPINE BESYLATE 5 MG: 5 TABLET ORAL at 08:53

## 2021-11-04 ASSESSMENT — ACTIVITIES OF DAILY LIVING (ADL)
HYGIENE/GROOMING: INDEPENDENT
ORAL_HYGIENE: INDEPENDENT
HYGIENE/GROOMING: INDEPENDENT
DRESS: INDEPENDENT
ORAL_HYGIENE: INDEPENDENT
DRESS: INDEPENDENT
LAUNDRY: WITH SUPERVISION

## 2021-11-04 NOTE — PROVIDER NOTIFICATION
11/04/21 1200   Engagement   Intervention Group   Topic Symptom management   Topic Detail SW: DBT   Attendance Attended   Patient Response No evidence of learning;Needs reinforcement/repetition to learn materials   Concentrated on Task duration of group   Cognition Confused;Preoccupied   Mood/Affect Flat   Social/Behavioral Cooperative   Thought Content [place]     GROUP LENGTH (MINS):   45min   RELEVANT PRIMARY DIAGNOSIS: Patient Active Problem List   Diagnosis     Psychosis (H)     Undifferentiated schizophrenia (H)        TREATMENT MODALITY:      []CBT       [x]DBT       []ACT       []Interpersonal psychotherapy                                 []Psychoeducational therapy       []Skill development       []Other:        ATTENDANCE:        [x]Stayed for entire group     []Arrived late    [] Left early       # OF PATIENTS IN GROUP: 6   BILLABLE:     [x]Yes            []No   Notes:

## 2021-11-04 NOTE — PROGRESS NOTES
11/04/21 1515   Engagement   Intervention Group   Topic Detail Watercoloring for leisure participation, concentration, coping skills and sequencing   Attendance Attended   Patient Response Demonstrated understanding of materials provided   Concentrated on Task duration of group   Cognition Goal-directed;Follows through with task   Mood/Affect Content;Flat   Social/Behavioral Cooperative;Engaged;Guarded   Thought Content [place]   Goals addressed in session today Pt progressing toward goal attending and engaging in group activity. Pt completing simple project, working at a slow pace.

## 2021-11-04 NOTE — PLAN OF CARE
Problem: Behavioral Health Plan of Care  Goal: Absence of New-Onset Illness or Injury  Outcome: Improving     Problem: Behavioral Health Plan of Care  Goal: Optimized Coping Skills in Response to Life Stressors  Outcome: Improving    Observed per routine. Safety--suicide precautions in place. No behavior issues this shift. Patient resting quietly with eyes closed, respirations noted even, and unlabored on all safety checks at [facility]. No indication to staff of being awake.  No signs of pain distress/discomfort.

## 2021-11-04 NOTE — PLAN OF CARE
Problem: Behavioral Health Plan of Care  Goal: Optimized Coping Skills in Response to Life Stressors  Outcome: Improving  Goal: Develops/Participates in Therapeutic [place]eaddiss to Support Successful Transition  Outcome: Improving  Intervention: Foster Therapeutic [place]diss  Recent Flowsheet Documentation  Taken 11/3/2021 1742 by Joyce Carmichael RN  Trust Relationship/Rapport:    care explained    choices provided    emotional support provided    empathic listening provided    questions answered    questions encouraged    reassurance provided    thoughts/feelings acknowledged     Problem: Suicidal Behavior  Goal: Suicidal Behavior is Absent or Managed  Outcome: Improving     Patient has been calm and cooperative.  Usually sits alone in the lounge watching everyone's moves. Paranoid and guarded. Pt attended groups, sits quietly, alone and barely interacts with staff and peers. Rated anxiety 5/10, denied pain, depression, SI/HI, hallucination. Patient appeared superficial and dismissive with assessment questions. Speech is noted to be very minimal, almost a whisper, but clear and coherent. V.S have been stable. Patient reported he has not had bm after prn colace was given in am, but refused further intervention. Patient would like to wait till tomorrow am. Pt also refused flu shot. Will continue to monitor.

## 2021-11-04 NOTE — PLAN OF CARE
"  Problem: Behavioral Health Plan of Care  Goal: Plan of Care Review  Outcome: Improving  Flowsheets (Taken 11/4/2021 0900)  Plan of Care Reviewed With: patient  Patient Agreement with Plan of Care: agrees     Problem: Suicidal Behavior  Goal: Suicidal Behavior is Absent or Managed  Outcome: Improving      Pt pleasat and cooperative with medications and assessment. With step by step explanations, pt agreed to take medications with out a problem. Attended community group and other activities. Affect flat, suspicious and guarded but answering questions appropriately.   pt sits quietly, alone and barely interacts with staff and peers. Rated anxiety 0/10, depression 0/10,denies SI/HI, hallucination. Patient appeared superficial and dismissive with assessment questions. Speech is noted to be very minimal and quiet, but clear and coherent. Sates \" I do not know how I am.\" visible in the unit , kept to him self. Ate 75% for break fast & 100% for lunch. Denies pain or discomfort. Took shower after lunch. On suicide precautions, no inappropriate behavior noted this time.  Will continue monitoring.      "

## 2021-11-04 NOTE — PROGRESS NOTES
"   11/04/21 1000   Engagement   Intervention Group   Topic Detail OT: Qigong exercise to promote daily living skills, mindfulness, gross motor movement, coping skills, and relaxation techniques.   Attendance Attended   Patient Response Needs reinforcement/repetition to learn materials;Was respectful   Concentrated on Task 30 - 45 min   Cognition Distractible;Preoccupied   Mood/Affect Flat   Social/Behavioral Cooperative;Engaged  (engaged 50% of the time, appears preoccupied)   Thought Content Blocked  (non-verbal during group )   Goals addressed in session today Pt participated passively, observing more than actively participating. Does not participate in feedback/processing following completion of group. Appears preoccupied. Pt attended community meeting prior to group and did respond verbally that his goal is to \"stay positive.\"     "

## 2021-11-04 NOTE — PLAN OF CARE
Assessment/Intervention, Care Coordination and Current Symptoms:   [facility] met with patient. He wished to call his wife, Arron, as the unit phones do not allow him to dial long distance. [facility] assisted patient with this phone call. Patient reports after speaking with his wife she has returned with their children from [facility] and are currently at their home in [place], [place]. Patient's wife wishes to visit the hospital this weekend, but patient declined any visitors. Patient continues to reports anxiety. He denies any SI or HI. When asked if he thinks someone is trying to harm him he reports yes. Patient reports thinking this way for 4 months. He reports feeling something bad may happen to him or his family. Patient reports he is taking his medications and has been able to sleep the past 2 nights. Declined signing any ROIs at this time so CTC unable to coordinate aftercare. [facility] consulted with psychiatrist and will continue to meet with patient for aftercare planning.    [facility] called patient's wife, Arron [phone], to obtain additional information about patient's care. When inquiring about if patient has experienced paranoia in the past she reported yes. Arron was very guarded and did not provide any additional information. She does wish to visit patient here, but [facility] informed her patient declined this. Arron reports she will be able to pick patient up when he is able to discharge. [facility] provided her with the unit phone number she is able to reach patient at.        Discharge Plan or Goal:   Home with community supports        Barriers to Discharge:  symptom stabilization, medication management, coordination of care        Referral Status:    None at this time due to patient being unwilling to sign ROIs       Legal Status:  Voluntary     Mami Olson [facility], 11/4/2021, 2:15 PM

## 2021-11-04 NOTE — PROGRESS NOTES
"PSYCHIATRY  PROGRESS NOTE     CHIEF COMPLAINT   \"Not feeling great\"       SUBJECTIVE/OBJECTIVE     Patient remains guarded and paranoid. He is preoccupied. He is visible on the unit but minimal interactions with staff or peers. He still has delusional thinking. He denies physical complaints. He accepted Abilify over past few days.       MEDICATIONS   Medications:  Scheduled Meds:    amLODIPine  5 mg Oral Daily     ARIPiprazole  15 mg Oral Daily     [Held by provider] busPIRone  10 mg Oral TID     FLUoxetine  20 mg Oral Daily     Continuous Infusions:  PRN Meds:.acetaminophen, alum & mag hydroxide-simethicone, cloNIDine, docusate sodium, hydrOXYzine, senna-docusate, traZODone    Medication adherence issues: MS Med Adherence Y/N: Yes, Not confident in efficacy of medication  Medication side effects: MEDICATION SIDE EFFECTS: no side effects reported  Benefit: Yes / No: Yes       ROS   Review of Systems is otherwise negative including HEENT, CV, Respiratory, GI, , Musculoskeletal, Neurologic, Dermatologic, Endocrine, Immunological, Constitutional systems         MENTAL STATUS EXAM   Vitals: BP (!) 134/100 (BP Location: Right arm)   Pulse 96   Temp 97.7  F (36.5  C) (Oral)   Resp 18   Ht 1.778 m (5' 10\")   Wt 73 kg (160 lb 14.4 oz)   SpO2 98%   BMI 23.09 kg/m      Appearance:  Casually groomed  Mood:  depressed  Affect: flat  Suicidal Ideation: denies  Homicidal Ideation:denies  Thought process:blocked, guarded  Thought content: paranoia persists but improving  Fund of Knowledge: Average  Attention/Concentration: Easily distracted  Language ability:  Intact  Memory:  Immediate recall intact, Short-term memory intact and Long-term memory impaired  Insight:  poor.  Judgement: fair  Orientation: Yes, x4  Psychomotor Behavior: normal  Muscle Strength and Tone: MuscleStrength: Normal  Gait and Station: Normal    Paranoia and delusions persist but appear to be somewhat improved over past few days     LABS   personally " reviewed.     No results found for: PHENYTOIN, PHENOBARB, VALPROATE, CBMZ       DIAGNOSIS   Principal Problem:    Undifferentiated schizophrenia (H)    Active Problem List:  Patient Active Problem List   Diagnosis     Psychosis (H)     Undifferentiated schizophrenia (H)          PLAN   1. Ongoing education given regarding diagnostic and treatment options with risks, benefits and alternatives and adequate verbalization of understanding.    2. Medications: Abilify increased to 15 mg daily                           Buspar 10 mg TID--hold for now                           Prozac 20 mg daily    3. Hospitalist consult: Hospitalist to see patient as needed. Patient's BP is currently elevated,Consult placed to the hospitalist,  hospitalist is aware and following patient     4. Legal: Switched to voluntary as patient agrees to stay on unit and take Abilify    5.  to follow regarding collecting and reviewing collateral information, referrals and disposition planning.    No further change in treatment plan        Risk Assessment: [facility] RISK ASSESSMENT: Patient able to contract for safety    Coordination of Care:   Treatment Plan reviewed and physician signed, Care discussed with Care/Treatment Team Members, Chart reviewed and Patient seen    Patient seen, chart reviewed, case reviewed with  and with nursing.   Case reviewed in multi-disciplinary treatment team.  More than 25 minutes spent on this visit with more than 50% time spent on coordination of care with staff, reviewing medical record, psychoeducation, providing supportive therapy regarding coping with chronic mental illness, entering orders and preparing documentation for the visit        Patel Justin MD    Re-Certification I certify that the inpatient psychiatric facility services furnished since the previous certification were, and continue to be, medically necessary for, either, treatment which could reasonably be expected to  improve the patient s condition or diagnostic study and that the hospital records indicate that the services furnished were, either, intensive treatment services, admission and related services necessary for diagnostic study, or equivalent services.     I certify that the patient continues to need, on a daily basis, active treatment furnished directly by or requiring the supervision of inpatient psychiatric facility personnel.   I estimate 5 days of hospitalization is necessary for proper treatment of the patient. My plans for post-hospital care for this patient are  home with family     Patel Justin MD

## 2021-11-05 PROCEDURE — 250N000013 HC RX MED GY IP 250 OP 250 PS 637: Performed by: PSYCHIATRY & NEUROLOGY

## 2021-11-05 PROCEDURE — 99231 SBSQ HOSP IP/OBS SF/LOW 25: CPT | Performed by: PSYCHIATRY & NEUROLOGY

## 2021-11-05 PROCEDURE — 124N000001 HC R&B MH

## 2021-11-05 PROCEDURE — 128N000001 HC R&B CD/MH ADULT

## 2021-11-05 PROCEDURE — 250N000013 HC RX MED GY IP 250 OP 250 PS 637: Performed by: NURSE PRACTITIONER

## 2021-11-05 PROCEDURE — 250N000013 HC RX MED GY IP 250 OP 250 PS 637: Performed by: INTERNAL MEDICINE

## 2021-11-05 RX ADMIN — FLUOXETINE HYDROCHLORIDE 20 MG: 20 CAPSULE ORAL at 08:21

## 2021-11-05 RX ADMIN — AMLODIPINE BESYLATE 5 MG: 5 TABLET ORAL at 08:21

## 2021-11-05 RX ADMIN — HYDROXYZINE HYDROCHLORIDE 25 MG: 25 TABLET, FILM COATED ORAL at 17:09

## 2021-11-05 RX ADMIN — ARIPIPRAZOLE 15 MG: 15 TABLET ORAL at 08:21

## 2021-11-05 ASSESSMENT — ACTIVITIES OF DAILY LIVING (ADL)
DRESS: SCRUBS (BEHAVIORAL HEALTH)
ORAL_HYGIENE: INDEPENDENT
HYGIENE/GROOMING: INDEPENDENT
ORAL_HYGIENE: INDEPENDENT
HYGIENE/GROOMING: INDEPENDENT
DRESS: INDEPENDENT

## 2021-11-05 NOTE — PLAN OF CARE
Assessment/Intervention, Care Coordination and Current Symptoms:   [facility] consulted with psychiatrist and met with patient. He continues to present paranoid and guarded. Patient is taking medication and eating. [facility] assisted patient with calling his wife, Arron [phone], per patient's request. She did not answer and patient did not want to leave a voicemail. CTC will attempt again with patient after lunch. Patient continues to report he does not want any visitors. [facility] unable to establish aftercare appointments as patient is declining to sign ROIs. [facility] will continue to consult with treatment team, meet with patient daily, and make referrals if patient is agreeable.    [facility] met with patient again after lunch to assist with calling his wife, Arron [phone]. This time she answered and patient was able to speak with his children as well. After this patient appeared to have some insight into paranoia and thinking people are not who they say at times. He reports this started when his wife and children left for [employer] about 4 months ago. Patient was willing to sign S[facility] for [facility] so [facility] can schedule medication management. Patient also reports needing a new PCP as his is too far from his home.        Discharge Plan or Goal:   Home with community supports        Barriers to Discharge:  symptom stabilization, medication management, coordination of care        Referral Status:    None at this time due to patient being unwilling to sign ROIs        Legal Status:  Voluntary     Mami Olson [facility], 11/5/2021, 1:43 PM

## 2021-11-05 NOTE — PROGRESS NOTES
"   11/05/21 1415   Engagement   Intervention Group   Topic Detail OT: Education regarding healthy engagement in activities and creative hands on endeavor ([employer]) to increase concentration, attention to task/detail, recovery planning, insight development, engagement in healthy distractions, healthy leisure engagement, and social engagement   Attendance Attended   Patient Response Was respectful;Demonstrated understanding of materials provided   Concentrated on Task duration of group   Cognition Goal-directed;Follows through with task   Mood/Affect Blunted   Social/Behavioral Cooperative   Thought Content Reality oriented  (Reality oriented to task)     Pt reported during check-in that he wants to \"clear my mind and be healthy\" this weekend in order to occupy his time. Pt sat among peers to complete project and did not engage in social interactions with peers. Pt progressing towards goal.  "

## 2021-11-05 NOTE — PLAN OF CARE
Problem: Behavioral Health Plan of Care  Goal: Adheres to Safety Considerations for Self and Others  Outcome: Improving  Intervention: Develop and Maintain Individualized Safety Plan  Recent Flowsheet Documentation  Taken 11/4/2021 7826 by Joyce Carmichael RN  Safety Measures:    suicide assessment completed    safety rounds completed    environmental rounds completed  Goal: Optimized Coping Skills in Response to Life Stressors  Outcome: Improving     Problem: Suicidal Behavior  Goal: Suicidal Behavior is Absent or Managed  Outcome: Improving     Pt was pleasant and smiling on approach. Cooperative with V.S. Remains guarded, superficial and dismissive with assessment questions. Denied pain, anxiety, depression and other psych symptoms. Attended community group and other activities. Out in the lounge most part of the shift, but is isolative to self. Minimal interaction with staff and peers. Speech still noted to be very minimal. Denies any acute distress. Appetite is adequate with good fluid intake. Patient continues on suicide precautions.

## 2021-11-05 NOTE — PLAN OF CARE
Problem: Behavioral Health Plan of Care  Goal: Patient-Specific Goal (Individualization)  Note: Unable to review care plan with patient-has been sleeping   Since the beginning of this shift

## 2021-11-05 NOTE — PROGRESS NOTES
"PSYCHIATRY  PROGRESS NOTE     CHIEF COMPLAINT   \"Not feeling great\"       SUBJECTIVE/OBJECTIVE     Patient remains paranoid and guarded. He is less reluctant to take medications. He states that he does not want visit from family and likely still has delusions about them but will not discuss. He has no medical complaints. He has no side effects to Abilify       MEDICATIONS   Medications:  Scheduled Meds:    amLODIPine  5 mg Oral Daily     ARIPiprazole  15 mg Oral Daily     [Held by provider] busPIRone  10 mg Oral TID     FLUoxetine  20 mg Oral Daily     Continuous Infusions:  PRN Meds:.acetaminophen, alum & mag hydroxide-simethicone, cloNIDine, docusate sodium, hydrOXYzine, senna-docusate, traZODone    Medication adherence issues: MS Med Adherence Y/N: Yes, Not confident in efficacy of medication  Medication side effects: MEDICATION SIDE EFFECTS: no side effects reported  Benefit: Yes / No: Yes       ROS   Review of Systems is otherwise negative including HEENT, CV, Respiratory, GI, , Musculoskeletal, Neurologic, Dermatologic, Endocrine, Immunological, Constitutional systems         MENTAL STATUS EXAM   Vitals: BP (!) 139/97   Pulse 88   Temp 98.6  F (37  C) (Oral)   Resp 18   Ht 1.778 m (5' 10\")   Wt 73 kg (160 lb 14.4 oz)   SpO2 100%   BMI 23.09 kg/m      Appearance:  Casually groomed  Mood:  depressed  Affect: flat  Suicidal Ideation: denies  Homicidal Ideation:denies  Thought process:blocked, guarded  Thought content: paranoia persists but improving  Fund of Knowledge: Average  Attention/Concentration: Easily distracted  Language ability:  Intact  Memory:  Immediate recall intact, Short-term memory intact and Long-term memory impaired  Insight:  poor.  Judgement: fair  Orientation: Yes, x4  Psychomotor Behavior: normal  Muscle Strength and Tone: MuscleStrength: Normal  Gait and Station: Normal    Gradual improvement in paranoia, but still substantial     LABS   personally reviewed.     No results found " for: PHENYTOIN, PHENOBARB, VALPROATE, CBMZ       DIAGNOSIS   Principal Problem:    Undifferentiated schizophrenia (H)    Active Problem List:  Patient Active Problem List   Diagnosis     Psychosis (H)     Undifferentiated schizophrenia (H)          PLAN   1. Ongoing education given regarding diagnostic and treatment options with risks, benefits and alternatives and adequate verbalization of understanding.    2. Medications: Abilify increased to 15 mg daily                           Buspar 10 mg TID--hold for now                           Prozac 20 mg daily    3. Hospitalist consult: Hospitalist to see patient as needed. Patient's BP is currently elevated,Consult placed to the hospitalist,  hospitalist is aware and following patient     4. Legal: Switched to voluntary as patient agrees to stay on unit and take Abilify    5.  to follow regarding collecting and reviewing collateral information, referrals and disposition planning.    No further change in treatment plan        Risk Assessment: [facility] RISK ASSESSMENT: Patient able to contract for safety    Coordination of Care:   Treatment Plan reviewed and physician signed, Care discussed with Care/Treatment Team Members, Chart reviewed and Patient seen    No further change in treatment plan          Patel Justin MD    Re-Certification I certify that the inpatient psychiatric facility services furnished since the previous certification were, and continue to be, medically necessary for, either, treatment which could reasonably be expected to improve the patient s condition or diagnostic study and that the hospital records indicate that the services furnished were, either, intensive treatment services, admission and related services necessary for diagnostic study, or equivalent services.     I certify that the patient continues to need, on a daily basis, active treatment furnished directly by or requiring the supervision of inpatient psychiatric  facility personnel.   I estimate 5 days of hospitalization is necessary for proper treatment of the patient. My plans for post-hospital care for this patient are  home with family     Patel Justin MD

## 2021-11-05 NOTE — PLAN OF CARE
Problem: Behavioral Health Plan of Care  Goal: Plan of Care Review  Outcome: Improving  Flowsheets (Taken 11/5/2021 1023)  Plan of Care Reviewed With: patient  Patient Agreement with Plan of Care: agrees     Problem: Suicidal Behavior  Goal: Suicidal Behavior is Absent or Managed  Outcome: Improving     Pt pleasant and cooperative with V.S. medications and assessment. Pt flat,blunted, guarded and dismissive. Visible in the unit, attended community group and other activities. Denied pain, anxiety, depression and other psych symptoms. Out in the lounge most part of the shift, but is isolative to self. Speech still noted to be very minimal. good fluid and fluid intake. Patient continues on suicide precautions. No inappropriate behavior noted this time. Will continue monitoring.

## 2021-11-05 NOTE — PROVIDER NOTIFICATION
11/05/21 1000   Engagement   Intervention Group   Topic Insight development/self-awareness   Topic Detail SW Process Group: Feelings and Needs   Attendance Attended   Patient Response Was respectful;Expressed feelings/issues   Concentrated on Task duration of group   Cognition Goal-directed   Mood/Affect Flat   Social/Behavioral Cooperative   Thought Content [place]     GROUP LENGTH (MINS):   25 min   RELEVANT PRIMARY DIAGNOSIS: Patient Active Problem List   Diagnosis     Psychosis (H)     Undifferentiated schizophrenia (H)        TREATMENT MODALITY:      [x]CBT       []DBT       []ACT       []Interpersonal psychotherapy                                 []Psychoeducational therapy       []Skill development       []Other:        ATTENDANCE:        [x]Stayed for entire group     []Arrived late    [] Left early       # OF PATIENTS IN GROUP: 2   BILLABLE:     []Yes            [x]No   Notes:

## 2021-11-05 NOTE — PROVIDER NOTIFICATION
11/05/21 1300   Engagement   Intervention Group   Topic Insight development/self-awareness   Topic Detail SW Process Group   Attendance Attended   Patient Response Contributes to conversation   Concentrated on Task duration of group   Cognition Confused;Preoccupied   Mood/Affect Sad   Social/Behavioral Engaged   Thought Content Insightful     GROUP LENGTH (MINS):   45   RELEVANT PRIMARY DIAGNOSIS: Patient Active Problem List   Diagnosis     Psychosis (H)     Undifferentiated schizophrenia (H)        TREATMENT MODALITY:      []CBT       []DBT       []ACT       []Interpersonal psychotherapy                                 [x]Psychoeducational therapy       []Skill development       []Other:        ATTENDANCE:        [x]Stayed for entire group     []Arrived late    [] Left early       # OF PATIENTS IN GROUP: 2   BILLABLE:     []Yes            [x]No   Notes:

## 2021-11-06 PROCEDURE — 128N000001 HC R&B CD/MH ADULT

## 2021-11-06 PROCEDURE — 250N000013 HC RX MED GY IP 250 OP 250 PS 637: Performed by: INTERNAL MEDICINE

## 2021-11-06 PROCEDURE — 90853 GROUP PSYCHOTHERAPY: CPT

## 2021-11-06 PROCEDURE — 124N000001 HC R&B MH

## 2021-11-06 PROCEDURE — 250N000013 HC RX MED GY IP 250 OP 250 PS 637: Performed by: NURSE PRACTITIONER

## 2021-11-06 PROCEDURE — 250N000013 HC RX MED GY IP 250 OP 250 PS 637: Performed by: PSYCHIATRY & NEUROLOGY

## 2021-11-06 RX ADMIN — AMLODIPINE BESYLATE 5 MG: 5 TABLET ORAL at 08:06

## 2021-11-06 RX ADMIN — ARIPIPRAZOLE 15 MG: 15 TABLET ORAL at 08:07

## 2021-11-06 RX ADMIN — FLUOXETINE HYDROCHLORIDE 20 MG: 20 CAPSULE ORAL at 08:07

## 2021-11-06 ASSESSMENT — MIFFLIN-ST. JEOR: SCORE: 1644.72

## 2021-11-06 ASSESSMENT — ACTIVITIES OF DAILY LIVING (ADL)
HYGIENE/GROOMING: INDEPENDENT
ORAL_HYGIENE: INDEPENDENT

## 2021-11-06 NOTE — PROVIDER NOTIFICATION
GROUP LENGTH (MINS):   45min   RELEVANT PRIMARY DIAGNOSIS: Patient Active Problem List   Diagnosis     Psychosis (H)     Undifferentiated schizophrenia (H)        TREATMENT MODALITY:      []CBT       []DBT       []ACT       []Interpersonal psychotherapy                                 [x]Psychoeducational therapy       []Skill development       []Other:        ATTENDANCE:        [x]Stayed for entire group     []Arrived late    [] Left early       # OF PATIENTS IN GROUP: 3   BILLABLE:     [x]Yes            []No   Notes:

## 2021-11-06 NOTE — PLAN OF CARE
Problem: Suicidal Behavior  Goal: Suicidal Behavior is Absent or Managed  Outcome: Improving     As of 0600, has slept ~ 7 hrs. Safety checks completed every 15 minutes. Suicide precautions continued. Pt has been sleeping uneventfully.      [place] in AM     Basaglar/Lantus 60  units at night     Apidra 8 - 12  units before dinner     Check sugars twice a day          Apidra fast acting for high sugars    Blood sugar  Breakfast/Lunch/Dinner       130-200  Add  4  Units       201-250  Add  8 Units       251-300  Add  12 Units       301-350  Add 16  Units        351-400  Add 20  Units

## 2021-11-06 NOTE — PROGRESS NOTES
Pt spoke quietly and therapist/peers had difficult time making out what pt was saying. Pt was observed only witting letter on the paper. Pt shared once with the group and most of the time he passed his turn. For check-in pt identified that spacing himself from others helps him with his cognitive wellness. Pt had poor concentration, attention, and appeared to be preoccupied at times.      11/06/21 1430   Engagement   Intervention Group   Topic Detail scrutineyes for concentration, healthy leisure/distraction, attention, socialization.   Attendance Attended   Patient Response Was respectful;Positive attitude   Concentrated on Task < 5 min   Cognition Distractible;Poor attention to detail   Mood/Affect Pleasant;Congruent   Social/Behavioral Engaged;Cooperative   Thought Content Blocked   Supervision   Supervision Direct supervision provided

## 2021-11-06 NOTE — PLAN OF CARE
"  Problem: Behavioral Health Plan of Care  Goal: Optimized Coping Skills in Response to Life Stressors  Outcome: No Change   Gerardo states that his anxiety is \"mild.\" He made little eye contact during conversations with RN, guarded. Accepted medication and expressed no interest in seeing the packaging. When asked if he is familiar with his meds and/or has any questions, shrugged and stated that he \"just take(s) what they give me.\" Later was reapproached to offer med information, and Gerardo stated that he didn't need to know anything else (had already been told names, doses and categories of meds). His answer to many questions, such as how he came to be in the hospital, was, \"I don't remember.\" He also answered, \"God knows,\" and \"God willing,\" when asked if he thought things could improve in his life and whether this was likely to happen. Very flat, apathetic. When asked if he has interests or things that bring him pleasure, \"I don't know.\" When asked if he had people who gave him emotional support, \"not really.\" Gerardo sat quietly, staring into space all shift, though he did sit in on community meeting. Even when sitting at a table with a puzzle half done on it, he didn't glance down at the puzzle. When asked about his thoughts, \"Just thinking about things,\" and wouldn't elaborate. He denies hallucinations. When asked about physical complaints, stated that he, \"can't smell anything,\" and that he is \"not sleeping well,\" though wouldn't elaborate on either thing. When asked about coping mechanisms for his anxiety, he said he didn't have any. When RN asked him about whether he would like to work on any coping skills, and suggested things she would volunteer to do with him today, he sounded indifferent, and had no suggestions of things he would like to try. Denied SI/SIB/HI.  "

## 2021-11-07 PROCEDURE — 250N000013 HC RX MED GY IP 250 OP 250 PS 637: Performed by: INTERNAL MEDICINE

## 2021-11-07 PROCEDURE — 250N000013 HC RX MED GY IP 250 OP 250 PS 637: Performed by: NURSE PRACTITIONER

## 2021-11-07 PROCEDURE — 124N000001 HC R&B MH

## 2021-11-07 PROCEDURE — 250N000013 HC RX MED GY IP 250 OP 250 PS 637: Performed by: PSYCHIATRY & NEUROLOGY

## 2021-11-07 PROCEDURE — 128N000001 HC R&B CD/MH ADULT

## 2021-11-07 RX ADMIN — HYDROXYZINE HYDROCHLORIDE 25 MG: 25 TABLET, FILM COATED ORAL at 16:29

## 2021-11-07 RX ADMIN — FLUOXETINE HYDROCHLORIDE 20 MG: 20 CAPSULE ORAL at 08:38

## 2021-11-07 RX ADMIN — AMLODIPINE BESYLATE 5 MG: 5 TABLET ORAL at 08:38

## 2021-11-07 RX ADMIN — ARIPIPRAZOLE 15 MG: 15 TABLET ORAL at 08:38

## 2021-11-07 ASSESSMENT — ACTIVITIES OF DAILY LIVING (ADL)
LAUNDRY: WITH SUPERVISION
ORAL_HYGIENE: INDEPENDENT
HYGIENE/GROOMING: INDEPENDENT
HYGIENE/GROOMING: INDEPENDENT
DRESS: INDEPENDENT
DRESS: INDEPENDENT
ORAL_HYGIENE: INDEPENDENT
LAUNDRY: WITH SUPERVISION

## 2021-11-07 NOTE — PROVIDER NOTIFICATION
Blood sugar of 352 at 2115. Hospitalist contacted for insulin orders. Insulin was not given at dinner as it was not available from pharmacist at that time.  Savanah Geiger RN

## 2021-11-07 NOTE — PLAN OF CARE
Problem: Suicidal Behavior  Goal: Suicidal Behavior is Absent or Managed  Outcome: Improving     As of 0630, has slept ~ 7 hrs. Safety checks completed every 15 minutes. Suicide precautions continued. Pt has been sleeping uneventfully.

## 2021-11-07 NOTE — PLAN OF CARE
Problem: Behavioral Health Plan of Care  Goal: Plan of Care Review  Outcome: No Change     Problem: Behavioral Health Plan of Care  Goal: Patient-Specific Goal (Individualization)  Outcome: Improving  Note:        Problem: Suicidal Behavior  Goal: Suicidal Behavior is Absent or Managed  Outcome: Improving

## 2021-11-07 NOTE — PLAN OF CARE
"  Problem: Behavioral Health Plan of Care  Goal: Absence of New-Onset Illness or Injury  Outcome: Improving  Intervention: Identify and Manage Fall Risk  Recent Flowsheet Documentation  Taken 11/7/2021 0830 by Casalenda, Gina R, RN  Safety Measures: safety rounds completed     Problem: Behavioral Health Plan of Care  Goal: Optimized Coping Skills in Response to Life Stressors  Outcome: Improving    Pt reports 5/10 anxiety - declines PRN pharmacological and nonpharmacological interventions. When asked about pt's depression, pt states, \"I don't know.\" Pt denies SI, HI, and hallucinations. Pt contracts for safety. Pt denies pain this shift. /100 - scheduled BP given. BP recheck 124/88. Pt reports he slept \"okay\" last night. Pt's buspar remains held by provider. Pt is medication compliant. Pt would like to discuss his scheduled medications w/provider. RN offered to educate pt on the medications he is taking, as well as offered educational handouts, but pt declined stating, \"i'll talk to my doctor.\" Pt also stated that either his abilify or prozac is making him sleepy during the day but is not sure which - he is wondering if either (or both) can be switched to bedtime - sticky note left for provider. Pt is guarded and dismissive. He is withdrawn and isolative to self. Pt visible on unit attending meals and sitting in the lounge. Pt intermittently works on a puzzle and colors. Pt does not engage or socialize with peers. He is on suicide precautions - no suicidal behavior noted. Will continue to monitor and assist as needed.   "

## 2021-11-07 NOTE — PROGRESS NOTES
11/07/21 1400   Engagement   Intervention Group   Topic Detail OT: Kimberly Ante and fuzzy posters to promote socialization, communication skills, values, healthy liesure exploration.    Attendance Attended   Patient Response Needs reinforcement/repetition to learn materials   Concentrated on Task duration of group   Cognition Follows through with task   Mood/Affect Flat   Social/Behavioral Cooperative   Goals addressed in session today Pt initiated task by selecting a fuzzy poster to color. He sat independently and colored the entire poster green.

## 2021-11-08 LAB — SARS-COV-2 RNA RESP QL NAA+PROBE: NEGATIVE

## 2021-11-08 PROCEDURE — 250N000013 HC RX MED GY IP 250 OP 250 PS 637: Performed by: NURSE PRACTITIONER

## 2021-11-08 PROCEDURE — 124N000001 HC R&B MH

## 2021-11-08 PROCEDURE — 87635 SARS-COV-2 COVID-19 AMP PRB: CPT | Performed by: PSYCHIATRY & NEUROLOGY

## 2021-11-08 PROCEDURE — 128N000001 HC R&B CD/MH ADULT

## 2021-11-08 PROCEDURE — 90853 GROUP PSYCHOTHERAPY: CPT

## 2021-11-08 PROCEDURE — 250N000013 HC RX MED GY IP 250 OP 250 PS 637: Performed by: INTERNAL MEDICINE

## 2021-11-08 PROCEDURE — 99232 SBSQ HOSP IP/OBS MODERATE 35: CPT | Performed by: PSYCHIATRY & NEUROLOGY

## 2021-11-08 PROCEDURE — 250N000013 HC RX MED GY IP 250 OP 250 PS 637: Performed by: PSYCHIATRY & NEUROLOGY

## 2021-11-08 RX ADMIN — ARIPIPRAZOLE 15 MG: 15 TABLET ORAL at 13:40

## 2021-11-08 RX ADMIN — FLUOXETINE HYDROCHLORIDE 20 MG: 20 CAPSULE ORAL at 13:40

## 2021-11-08 RX ADMIN — AMLODIPINE BESYLATE 5 MG: 5 TABLET ORAL at 09:49

## 2021-11-08 ASSESSMENT — ACTIVITIES OF DAILY LIVING (ADL)
ORAL_HYGIENE: INDEPENDENT
LAUNDRY: WITH SUPERVISION
LAUNDRY: WITH SUPERVISION
HYGIENE/GROOMING: INDEPENDENT
DRESS: INDEPENDENT
DRESS: INDEPENDENT
ORAL_HYGIENE: INDEPENDENT
HYGIENE/GROOMING: INDEPENDENT

## 2021-11-08 NOTE — PROGRESS NOTES
11/08/21 1514   Engagement   Intervention Group   Topic Detail OT: [place] manual skills task for creative expression, planning/organizing/sequencing/attention to detail, leisure exploration and coping with stress and sxs   Attendance Attended   Patient Response Needs reinforcement/repetition to learn materials;Was respectful   Concentrated on Task 30 - 45 min   Cognition Goal-directed;Confused;Follows through with task   Mood/Affect Content;Pleasant   Social/Behavioral Cooperative;Disengaged   Thought Content [place];Disorganized     Pt attended group and chose a simple project to work on; pt worked in a slow manner and lacked some attention to detail but appeared pleased with his finished project; pt was socially reserved but appeared content while in group.

## 2021-11-08 NOTE — PROVIDER NOTIFICATION
11/08/21 1400   Engagement   Intervention Group   Topic Detail SW Depression   Attendance Attended   Patient Response Expressed feelings/issues   Concentrated on Task duration of group   Cognition Follows through with task   Mood/Affect Flat   Social/Behavioral Guarded   Thought Content [place]     GROUP LENGTH (MINS):   25 minutes   RELEVANT PRIMARY DIAGNOSIS: Patient Active Problem List   Diagnosis     Psychosis (H)     Undifferentiated schizophrenia (H)        TREATMENT MODALITY:      []CBT       []DBT       []ACT       []Interpersonal psychotherapy                                 [x]Psychoeducational therapy       []Skill development       []Other:        ATTENDANCE:        [x]Stayed for entire group     []Arrived late    [] Left early       # OF PATIENTS IN GROUP: 3   BILLABLE:     []Yes            [x]No   Notes:

## 2021-11-08 NOTE — PLAN OF CARE
Problem: Behavioral Health Plan of Care  Goal: Plan of Care Review  Outcome: Improving  Flowsheets (Taken 11/8/2021 4022)  Plan of Care Reviewed With: patient  Patient Agreement with Plan of Care: agrees     Problem: Suicidal Behavior  Goal: Suicidal Behavior is Absent or Managed  Outcome: Improving     Pt was observed being quiet, guarded and isolative. He kept to himself while out in the milieux. Did not socialized or engaged with peers. Attended community meeting. His affect was flat and blunted. He denied pain all shift. Denied anxiety. Rated depression 4/10. Denied SI/HI. Denied hallucination/delusion. Contracted for safety. Vitals stable with b/p 132/101 sitting and 138/101 standing. Pt was asymptomatic. Prn not given because order parameter was not med. Had a shower. No other concerns or behavior noted at this time. Will continue to monitor and will assist if need arise.

## 2021-11-08 NOTE — PLAN OF CARE
Problem: Behavioral Health Plan of Care  Goal: Plan of Care Review  Outcome: Improving  Goal: Develops/Participates in Therapeutic [place] to Support Successful Transition  Outcome: Improving     Problem: Suicidal Behavior  Goal: Suicidal Behavior is Absent or Managed  Outcome: Improving     Pt voices no c/o pain. Pt resting in room with lights off, did not come out of room for snack or karaoke. Safety checks completed q 15 minutes per unit policy. Pt on suicide precautions, no concerns noted.    Sophie Harry RN

## 2021-11-08 NOTE — PROVIDER NOTIFICATION
11/08/21 1200   Engagement   Intervention Group   Topic Detail SW DBT Process   Attendance Attended   Patient Response No evidence of learning   Concentrated on Task duration of group   Cognition Confused   Mood/Affect Flat   Social/Behavioral Guarded   Thought Content Lacks insight/judgement     GROUP LENGTH (MINS):   45   RELEVANT PRIMARY DIAGNOSIS: Patient Active Problem List   Diagnosis     Psychosis (H)     Undifferentiated schizophrenia (H)        TREATMENT MODALITY:      []CBT       [x]DBT       []ACT       []Interpersonal psychotherapy                                 []Psychoeducational therapy       [x]Skill development       []Other:        ATTENDANCE:        [x]Stayed for entire group     []Arrived late    [] Left early       # OF PATIENTS IN GROUP: 4   BILLABLE:     [x]Yes            []No   Notes:

## 2021-11-08 NOTE — PROGRESS NOTES
"PSYCHIATRY  PROGRESS NOTE     CHIEF COMPLAINT   \"Not feeling great\"       SUBJECTIVE/OBJECTIVE     Patient remains paranoid and guarded, but he is showing clear improvement. He is willing to have a visit from family and anticipates living with them soon, so he appears to be less paranoid about them. He attends groups but only has minimal participation and he does not interact with staff or peers in general. He is visible on unit. He is cooperative and remains voluntary. He is hoping for discharge later this week. He is tolerating his medications.       MEDICATIONS   Medications:  Scheduled Meds:    amLODIPine  5 mg Oral Daily     ARIPiprazole  15 mg Oral Daily     [Held by provider] busPIRone  10 mg Oral TID     FLUoxetine  20 mg Oral Daily     Continuous Infusions:  PRN Meds:.acetaminophen, alum & mag hydroxide-simethicone, cloNIDine, docusate sodium, hydrOXYzine, senna-docusate, traZODone    Medication adherence issues: MS Med Adherence Y/N: Yes, Not confident in efficacy of medication  Medication side effects: MEDICATION SIDE EFFECTS: no side effects reported  Benefit: Yes / No: Yes       ROS   Review of Systems is otherwise negative including HEENT, CV, Respiratory, GI, , Musculoskeletal, Neurologic, Dermatologic, Endocrine, Immunological, Constitutional systems         MENTAL STATUS EXAM   Vitals: BP (!) 134/100 (BP Location: Left arm)   Pulse 77   Temp 97.7  F (36.5  C) (Oral)   Resp 16   Ht 1.778 m (5' 10\")   Wt 73.3 kg (161 lb 11.2 oz)   SpO2 99%   BMI 23.20 kg/m      Appearance:  Casually groomed  Mood:  depressed  Affect: flat  Suicidal Ideation: denies  Homicidal Ideation:denies  Thought process:blocked, guarded  Thought content: paranoia persists but improving  Fund of Knowledge: Average  Attention/Concentration: Easily distracted  Language ability:  Intact  Memory:  Immediate recall intact, Short-term memory intact and Long-term memory impaired  Insight:  poor.  Judgement: fair  Orientation: " Yes, x4  Psychomotor Behavior: normal  Muscle Strength and Tone: MuscleStrength: Normal  Gait and Station: Normal    Gradual improvement over past few days but some paranoia persists     LABS   personally reviewed.     No results found for: PHENYTOIN, PHENOBARB, VALPROATE, CBMZ       DIAGNOSIS   Principal Problem:    Undifferentiated schizophrenia (H)    Active Problem List:  Patient Active Problem List   Diagnosis     Psychosis (H)     Undifferentiated schizophrenia (H)          PLAN   1. Ongoing education given regarding diagnostic and treatment options with risks, benefits and alternatives and adequate verbalization of understanding.    2. Medications: Abilify increased to 15 mg daily                           Buspar 10 mg TID--hold for now                           Prozac 20 mg daily    3. Hospitalist consult: Hospitalist to see patient as needed. Patient's BP is currently elevated,Consult placed to the hospitalist,  hospitalist is aware and following patient     4. Legal: Switched to voluntary as patient agrees to stay on unit and take Abilify    5.  to follow regarding collecting and reviewing collateral information, referrals and disposition planning.    No further change in treatment plan    Patient seen, chart reviewed, case reviewed with  and with nursing.   Case reviewed in multi-disciplinary treatment team.    More than 25 minutes spent on this visit with more than 50% time spent on coordination of care with staff, reviewing medical record, psychoeducation, providing supportive therapy regarding coping with chronic mental illness, entering orders and preparing documentation for the visit          Risk Assessment:  [facility] RISK ASSESSMENT: Patient able to contract for safety    Coordination of Care:   Treatment Plan reviewed and physician signed, Care discussed with Care/Treatment Team Members, Chart reviewed and Patient seen    No further change in treatment  plan          Patel Justin MD    Re-Certification I certify that the inpatient psychiatric facility services furnished since the previous certification were, and continue to be, medically necessary for, either, treatment which could reasonably be expected to improve the patient s condition or diagnostic study and that the hospital records indicate that the services furnished were, either, intensive treatment services, admission and related services necessary for diagnostic study, or equivalent services.     I certify that the patient continues to need, on a daily basis, active treatment furnished directly by or requiring the supervision of inpatient psychiatric facility personnel.   I estimate 5 days of hospitalization is necessary for proper treatment of the patient. My plans for post-hospital care for this patient are  home with family     Patel Justin MD

## 2021-11-08 NOTE — PLAN OF CARE
BEHAVIORAL TEAM DISCUSSION    Participants: Filomena Boykin RN, Lisa ART - Pharmacy, Mami MALDONADO, Christen ART - OT, Dr Justin - Psychiatry  Progress: Improving  Anticipated length of stay: TBD  Continued Stay Criteria/Rationale: symptom stabilization, medication management, coordination of care  Medical/Physical: blood pressure being monitored  Precautions:   Behavioral Orders   Procedures    Code 1 - Restrict to Unit    Routine Programming     As clinically indicated    Status 15     Every 15 minutes.    Suicide precautions     Patients on Suicide Precautions should have a Combination Diet ordered that includes a Diet selection(s) AND a Behavioral Tray selection for Safe Tray - with utensils, or Safe Tray - NO utensils       Plan: Home with community supports  Rationale for change in precautions or plan: no change

## 2021-11-08 NOTE — PLAN OF CARE
"  Problem: Behavioral Health Plan of Care  Goal: Absence of New-Onset Illness or Injury  Outcome: Improving  Intervention: Identify and Manage Fall Risk  Recent Flowsheet Documentation  Taken 11/8/2021 0830 by Casalenda, Gina R, RN  Safety Measures: safety rounds completed     Problem: Behavioral Health Plan of Care  Goal: Develops/Participates in Therapeutic [place] to Support Successful Transition  Outcome: Improving  Intervention: Foster Therapeutic [place]  Recent Flowsheet Documentation  Taken 11/8/2021 0830 by Casalenda, Gina R, RN  Trust Relationship/Rapport:    care explained    choices provided    questions answered    empathic listening provided    emotional support provided    questions encouraged    reassurance provided    thoughts/feelings acknowledged     Pt reports 5/10 anxiety - declines pharmacological and nonpharmacological intervention. Pt reports depression is \"okay.\" Pt denies SI, HI, and hallucinations. He contracts for safety. Pt denies pain this shift. Medication compliant. Pt's prozac and abilify rescheduled to the afternoon per pt request. He is cooperative. Presents with a flat affect. He is quiet. Pt withdrawn and isolative to self. Pt does not engage or socialize with peers. Pt visible on unit sitting in the lounge, attending meals, and intermittently pacing the hallways. Pt attended group this shift. Pt's eye contact often at the floor or staring off into space. Pt's BP elevated at 134/100 - scheduled amlodipine given. Recheck 136/98 - order parameters for PRN BP medication not met. Pt is asymptomatic. Pt also received his COVID swab today per unit policy - results negative. Pt did not want writer to give him his COVID test in his room - he appeared paranoid. Writer offered to give pt his COVID swab in the hallway - pt okay with this. He is on suicide precautions - no suicidal behavior noted. Will continue to monitor and assist as needed.   "

## 2021-11-08 NOTE — PLAN OF CARE
Problem: Suicidal Behavior  Goal: Suicidal Behavior is Absent or Managed  Outcome: Improving     As of 0620, has slept ~ 7 hrs. Safety checks completed every 15 minutes. Suicide precautions continued. Pt has been sleeping uneventfully.

## 2021-11-09 PROCEDURE — 250N000013 HC RX MED GY IP 250 OP 250 PS 637: Performed by: NURSE PRACTITIONER

## 2021-11-09 PROCEDURE — 128N000001 HC R&B CD/MH ADULT

## 2021-11-09 PROCEDURE — 250N000013 HC RX MED GY IP 250 OP 250 PS 637: Performed by: INTERNAL MEDICINE

## 2021-11-09 PROCEDURE — 99231 SBSQ HOSP IP/OBS SF/LOW 25: CPT | Performed by: PSYCHIATRY & NEUROLOGY

## 2021-11-09 PROCEDURE — 124N000001 HC R&B MH

## 2021-11-09 RX ADMIN — ACETAMINOPHEN 650 MG: 325 TABLET ORAL at 17:52

## 2021-11-09 RX ADMIN — AMLODIPINE BESYLATE 5 MG: 5 TABLET ORAL at 11:15

## 2021-11-09 RX ADMIN — TRAZODONE HYDROCHLORIDE 50 MG: 50 TABLET ORAL at 20:28

## 2021-11-09 ASSESSMENT — ACTIVITIES OF DAILY LIVING (ADL)
ORAL_HYGIENE: INDEPENDENT
HYGIENE/GROOMING: INDEPENDENT
DRESS: INDEPENDENT
LAUNDRY: WITH SUPERVISION
ORAL_HYGIENE: INDEPENDENT
DRESS: INDEPENDENT
HYGIENE/GROOMING: INDEPENDENT

## 2021-11-09 ASSESSMENT — MIFFLIN-ST. JEOR: SCORE: 1648.35

## 2021-11-09 NOTE — PROGRESS NOTES
11/09/21 1500   Engagement   Intervention Group   Topic Detail OT: [employer] projects for creative expression, coping with stress and sxs, opportunity to engage with others and building leisure skills   Attendance Did not attend   Reason for Not Attending Excused     Pt was in a meeting with his wife during group.

## 2021-11-09 NOTE — PLAN OF CARE
"  Problem: Relapse Prevention  Goal: Engage in OT Group  Description: Engage in at least 1 OT group activity daily in a reality based manner and free of paranoia   Outcome: Improving   Goal review completed:       Patient has attended 7 out of 8 OT groups offered this reporting period. Patient in lounge on approach for goal review. Pt talked quietly. Pt stated he likes all types of groups both physical movement and hands on activity. Pt shared he stays active at home by playing basketball and soccer outside with his kids. When asked how many kids patient has, pt stated \"there should be 5\". Pt shared he would not be able to attend afternoon OT group due to a scheduled meeting. per chart review pt is quiet during groups and does not readily engage with peers or staff. Care plan goals have been reviewed. Continue care plan and interventions for further progress related to OT group attendance in a reality based manner and free of paranoia.  Patient has made progress towards goal.  Continue to establish rapport and encourage group participation with focus on goal area/s for further progress.  Continue to correspond with interdisciplinary team regarding ongoing treatment plan, potential changes in patient's status, and discharge planning.    "

## 2021-11-09 NOTE — PROVIDER NOTIFICATION
11/09/21 1100   Engagement   Intervention Group   Topic Detail SW DBT   Attendance Attended   Patient Response Expressed feelings/issues   Concentrated on Task duration of group   Cognition Follows through with task   Mood/Affect Flat   Social/Behavioral Cooperative;Engaged   Thought Content [place]     GROUP LENGTH (MINS):   30    RELEVANT PRIMARY DIAGNOSIS: Patient Active Problem List   Diagnosis     Psychosis (H)     Undifferentiated schizophrenia (H)        TREATMENT MODALITY:      []CBT       [x]DBT       []ACT       []Interpersonal psychotherapy                                 []Psychoeducational therapy       []Skill development       []Other:        ATTENDANCE:        [x]Stayed for entire group     []Arrived late    [] Left early       # OF PATIENTS IN GROUP: 3   BILLABLE:     []Yes            [x]No   Notes:

## 2021-11-09 NOTE — PROVIDER NOTIFICATION
11/09/21 1300   Engagement   Intervention Group   Topic Recovery planning   Topic Detail SW: General    Attendance Attended   Patient Response Was respectful;No evidence of learning   Concentrated on Task 15 - 30 min   Cognition Confused   Mood/Affect Flat   Social/Behavioral Cooperative   Thought Content [place]     GROUP LENGTH (MINS):   20min   RELEVANT PRIMARY DIAGNOSIS: Patient Active Problem List   Diagnosis     Psychosis (H)     Undifferentiated schizophrenia (H)        TREATMENT MODALITY:      []CBT       []DBT       []ACT       []Interpersonal psychotherapy                                 [x]Psychoeducational therapy       []Skill development       []Other:        ATTENDANCE:        []Stayed for entire group     []Arrived late    [x] Left early       # OF PATIENTS IN GROUP: 1   BILLABLE:     []Yes            [x]No   Notes:

## 2021-11-09 NOTE — PLAN OF CARE
Assessment/Intervention, Care Coordination and Current Symptoms:   [facility] met with patient and his wife, Arron, for an in-person care conference on the unit. An Somalian interpretor was also in the conference. Arron was able to ask questions about patient's care and express any concerns. CTC emphasized the importance of following up with aftercare appointments. Arron reports feeling patient is okay and is able to return home as soon as the doctor allows him to. Arron is unable to pick patient up due to the travel distance and . They agreed on plan for patient's cousin, Gerardo, to pick him up for discharge when appropriate. CTC allowed some time for patient and his wife to visit while supervised. Denied any other questions or concerns. CTC will consult with the psychiatrist and update patient's family regarding discharge.        Discharge Plan or Goal:  Home with community supports        Barriers to Discharge:  symptom stabilization, medication management, coordination of care        Referral Status:    Established provider:  HUAN Antonio at [facility]        Legal Status:  Voluntary     Mami Olson [facility], 11/9/2021, 3:30 PM

## 2021-11-09 NOTE — PROGRESS NOTES
"PSYCHIATRY  PROGRESS NOTE     CHIEF COMPLAINT   \"Not feeling great\"       SUBJECTIVE/OBJECTIVE     Patient remains paranoid and guarded, but he is showing clear improvement. He is willing to have a visit from family and anticipates living with them soon, so he appears to be less paranoid about them. He attends groups but only has minimal participation and he does not interact with staff or peers in general. He is visible on unit. He is cooperative and remains voluntary. He is hoping for discharge later this week. He is tolerating his medications. He has had minimal change in the past 24 hours. He has a family meeting pending today       MEDICATIONS   Medications:  Scheduled Meds:    amLODIPine  5 mg Oral Daily     ARIPiprazole  15 mg Oral Daily     [Held by provider] busPIRone  10 mg Oral TID     FLUoxetine  20 mg Oral Daily     Continuous Infusions:  PRN Meds:.acetaminophen, alum & mag hydroxide-simethicone, cloNIDine, docusate sodium, hydrOXYzine, senna-docusate, traZODone    Medication adherence issues: MS Med Adherence Y/N: Yes, Not confident in efficacy of medication  Medication side effects: MEDICATION SIDE EFFECTS: no side effects reported  Benefit: Yes / No: Yes       ROS   Review of Systems is otherwise negative including HEENT, CV, Respiratory, GI, , Musculoskeletal, Neurologic, Dermatologic, Endocrine, Immunological, Constitutional systems         MENTAL STATUS EXAM   Vitals: BP (!) 132/101 (BP Location: Left arm, Patient Position: Sitting)   Pulse 66   Temp 97.7  F (36.5  C)   Resp 20   Ht 1.778 m (5' 10\")   Wt 73.7 kg (162 lb 8 oz)   SpO2 98%   BMI 23.32 kg/m      Appearance:  Casually groomed  Mood:  depressed  Affect: flat  Suicidal Ideation: denies  Homicidal Ideation:denies  Thought process:blocked, guarded  Thought content: less paranoia  Fund of Knowledge: Average  Attention/Concentration: Easily distracted  Language ability:  Intact  Memory:  Immediate recall intact, Short-term memory " intact and Long-term memory impaired  Insight:  poor.  Judgement: fair  Orientation: Yes, x4  Psychomotor Behavior: normal  Muscle Strength and Tone: MuscleStrength: Normal  Gait and Station: Normal    Gradual improvement over past few days but some paranoia persists     LABS   personally reviewed.     No results found for: PHENYTOIN, PHENOBARB, VALPROATE, CBMZ       DIAGNOSIS   Principal Problem:    Undifferentiated schizophrenia (H)    Active Problem List:  Patient Active Problem List   Diagnosis     Psychosis (H)     Undifferentiated schizophrenia (H)          PLAN   1. Ongoing education given regarding diagnostic and treatment options with risks, benefits and alternatives and adequate verbalization of understanding.    2. Medications: Abilify increased to 15 mg daily                           Buspar 10 mg TID--hold for now                           Prozac 20 mg daily    3. Hospitalist consult: Hospitalist to see patient as needed. Patient's BP is currently elevated,Consult placed to the hospitalist,  hospitalist is aware and following patient     4. Legal: Switched to voluntary as patient agrees to stay on unit and take Abilify    5.  to follow regarding collecting and reviewing collateral information, referrals and disposition planning.        Risk Assessment: [facility] RISK ASSESSMENT: Patient able to contract for safety    No further change in treatment plan  Patient seen, chart reviewed, case reviewed with  and with nursing.       Patel Justin MD    Re-Certification I certify that the inpatient psychiatric facility services furnished since the previous certification were, and continue to be, medically necessary for, either, treatment which could reasonably be expected to improve the patient s condition or diagnostic study and that the hospital records indicate that the services furnished were, either, intensive treatment services, admission and related services necessary  for diagnostic study, or equivalent services.     I certify that the patient continues to need, on a daily basis, active treatment furnished directly by or requiring the supervision of inpatient psychiatric facility personnel.   I estimate 5 days of hospitalization is necessary for proper treatment of the patient. My plans for post-hospital care for this patient are  home with family     Patel Justin MD

## 2021-11-09 NOTE — PLAN OF CARE
Problem: Behavioral Health Plan of Care  Goal: Plan of Care Review  Outcome: Improving     Problem: Suicidal Behavior  Goal: Suicidal Behavior is Absent or Managed  Outcome: Improving  Flowsheets (Taken 11/9/2021 0009)  Mutually Determined Action Steps (Suicidal Behavior Absent/Managed): verbalizes safety check rationale    Pt appears to be in stable condition and resting well. Safety maintained per protocol.  Pt slept for about 7 hours. Noted respiration even and unlabored. Pt denies SI/HI and all other psych symptoms. Precaution for suicidal behavior continues. No behavior noted. No prn medication given.

## 2021-11-09 NOTE — PROGRESS NOTES
11/09/21 1018   Engagement   Intervention Group   Topic Detail OT: Qigong mind-body exercise and education for total body wellness, parasympathetic activation, coping with stress/sxs and improving mood and self-awareness   Attendance Attended   Patient Response Needs reinforcement/repetition to learn materials;Was respectful   Concentrated on Task duration of group   Cognition Goal-directed;Confused   Mood/Affect Flat;Content   Social/Behavioral Disengaged   Thought Content [place]     Pt engaged in ~50% of Qigong exercises while seated; pt appeared content and reality oriented in context of the activity; pt contributed minimally to the discussion but stated that he liked the exercise.

## 2021-11-09 NOTE — PLAN OF CARE
"  Problem: Behavioral Health Plan of Care  Goal: Absence of New-Onset Illness or Injury  Outcome: Improving     Problem: Behavioral Health Plan of Care  Goal: Develops/Participates in Therapeutic [place] to Support Successful Transition  Outcome: Improving     Problem: Suicidal Behavior  Goal: Suicidal Behavior is Absent or Managed  Outcome: Improving  Patient denies anxiety/depression, suicide ideation and all other psych symptoms. Contracted for safety.  Patient stated \" I did not sleep last night\". Encouraged to informed about when he can't sleep.  Attended community meetings,  sat by himself with no participation in group discussions. Will continue to encourage pt to engage and social with peers.      "

## 2021-11-10 PROCEDURE — 124N000001 HC R&B MH

## 2021-11-10 PROCEDURE — 128N000001 HC R&B CD/MH ADULT

## 2021-11-10 PROCEDURE — 250N000013 HC RX MED GY IP 250 OP 250 PS 637: Performed by: PSYCHIATRY & NEUROLOGY

## 2021-11-10 PROCEDURE — 99231 SBSQ HOSP IP/OBS SF/LOW 25: CPT | Performed by: PSYCHIATRY & NEUROLOGY

## 2021-11-10 PROCEDURE — 250N000013 HC RX MED GY IP 250 OP 250 PS 637: Performed by: NURSE PRACTITIONER

## 2021-11-10 PROCEDURE — 250N000013 HC RX MED GY IP 250 OP 250 PS 637: Performed by: INTERNAL MEDICINE

## 2021-11-10 PROCEDURE — 90853 GROUP PSYCHOTHERAPY: CPT

## 2021-11-10 RX ADMIN — ACETAMINOPHEN 650 MG: 325 TABLET ORAL at 18:13

## 2021-11-10 RX ADMIN — FLUOXETINE HYDROCHLORIDE 20 MG: 20 CAPSULE ORAL at 10:03

## 2021-11-10 RX ADMIN — AMLODIPINE BESYLATE 5 MG: 5 TABLET ORAL at 08:37

## 2021-11-10 RX ADMIN — ARIPIPRAZOLE 15 MG: 15 TABLET ORAL at 09:59

## 2021-11-10 ASSESSMENT — ACTIVITIES OF DAILY LIVING (ADL)
ORAL_HYGIENE: INDEPENDENT
HYGIENE/GROOMING: INDEPENDENT
DRESS: INDEPENDENT
DRESS: INDEPENDENT
ORAL_HYGIENE: INDEPENDENT
HYGIENE/GROOMING: INDEPENDENT

## 2021-11-10 NOTE — PLAN OF CARE
Problem: Sleep Disturbance  Goal: Adequate Sleep/Rest  Outcome: Improving    Pt had more than 7 hours of sleep. No signs of pain or discomfort noted during 15 minutes safety checks. Pt appeared comfortable with no distress. No other concerns or behavior noted this shift. Will continue to monitor and will assist if need arise.

## 2021-11-10 NOTE — PLAN OF CARE
Problem: Behavioral Health Plan of Care  Goal: Absence of New-Onset Illness or Injury  Outcome: Improving     Problem: Suicidal Behavior  Goal: Suicidal Behavior is Absent or Managed  Outcome: Improving  Patient observed calm, quiet, denies anxiety and depression, pain/discomfort and other psych symptoms.  Refused morning medications. After multiple approaches Pt took his  morning medications. Spent the rest of the shift pacing in hallway with no complains.

## 2021-11-10 NOTE — PROGRESS NOTES
"PSYCHIATRY  PROGRESS NOTE     CHIEF COMPLAINT   \"Not feeling great\"       SUBJECTIVE/OBJECTIVE     Patient still quiet and interacts minimally with staff or peers. He still has some mild paranoia. However, he is doing much better. He did well in a family meeting yesterday, and did not have paranoia towards his wife. He is still somewhat reluctant to take medications but agrees to comply. He is hoping for discharge tomorrow. He has no physical complaints.     MEDICATIONS   Medications:  Scheduled Meds:    amLODIPine  5 mg Oral Daily     ARIPiprazole  15 mg Oral Daily     [Held by provider] busPIRone  10 mg Oral TID     FLUoxetine  20 mg Oral Daily     Continuous Infusions:  PRN Meds:.acetaminophen, alum & mag hydroxide-simethicone, cloNIDine, docusate sodium, hydrOXYzine, senna-docusate, traZODone    Medication adherence issues: MS Med Adherence Y/N: Yes, Not confident in efficacy of medication  Medication side effects: MEDICATION SIDE EFFECTS: no side effects reported  Benefit: Yes / No: Yes       ROS   Review of Systems is otherwise negative including HEENT, CV, Respiratory, GI, , Musculoskeletal, Neurologic, Dermatologic, Endocrine, Immunological, Constitutional systems         MENTAL STATUS EXAM   Vitals: BP (!) 143/98 (BP Location: Right arm, Patient Position: Sitting)   Pulse 68   Temp 97.6  F (36.4  C)   Resp 18   Ht 1.778 m (5' 10\")   Wt 73.7 kg (162 lb 8 oz)   SpO2 99%   BMI 23.32 kg/m      Appearance:  Casually groomed  Mood:  depressed  Affect: flat  Suicidal Ideation: denies  Homicidal Ideation:denies  Thought process:less guarded  Thought content: less paranoia  Fund of Knowledge: Average  Attention/Concentration: normal  Language ability:  Intact  Memory:  Immediate recall intact, Short-term memory intact and Long-term memory normal  Insight:  poor.  Judgement: fair  Orientation: Yes, x4  Psychomotor Behavior: normal  Muscle Strength and Tone: MuscleStrength: Normal  Gait and Station: " Normal    Patient showing gradual improvement     LABS   personally reviewed.     No results found for: PHENYTOIN, PHENOBARB, VALPROATE, CBMZ       DIAGNOSIS   Principal Problem:    Undifferentiated schizophrenia (H)    Active Problem List:  Patient Active Problem List   Diagnosis     Psychosis (H)     Undifferentiated schizophrenia (H)          PLAN   1. Ongoing education given regarding diagnostic and treatment options with risks, benefits and alternatives and adequate verbalization of understanding.    2. Medications: Abilify increased to 15 mg daily                           Buspar 10 mg TID--will discontinue                           Prozac 20 mg daily    3. Hospitalist consult: Hospitalist to see patient as needed. Patient's BP is currently elevated,Consult placed to the hospitalist,  hospitalist is aware and following patient     4. Legal: Switched to voluntary as patient agrees to stay on unit and take Abilify    5.  to follow regarding collecting and reviewing collateral information, referrals and disposition planning.        Risk Assessment: [facility] RISK ASSESSMENT: Patient able to contract for safety    No further change in treatment plan  Patient seen, chart reviewed, case reviewed with  and with nursing.       Patel Justin MD    Re-Certification I certify that the inpatient psychiatric facility services furnished since the previous certification were, and continue to be, medically necessary for, either, treatment which could reasonably be expected to improve the patient s condition or diagnostic study and that the hospital records indicate that the services furnished were, either, intensive treatment services, admission and related services necessary for diagnostic study, or equivalent services.     I certify that the patient continues to need, on a daily basis, active treatment furnished directly by or requiring the supervision of inpatient psychiatric facility  personnel.   I estimate 3 days of hospitalization is necessary for proper treatment of the patient. My plans for post-hospital care for this patient are  home with family     Patel Justin MD

## 2021-11-10 NOTE — PLAN OF CARE
Assessment/Intervention, Care Coordination and Current Symptoms:   [facility] consulted with psychiatrist and met with patient. Anticipated discharge tomorrow. Patient will need 30 day supply of medications filled here to take with him. CTC review aftercare plan with patient and he is agreeable to take medications and follow up with scheduled psych appointment. Patient wishes to take a taxi home or a shuttle/train versus going to his cousin's house. [facility] will inquire about transportation options to his home in [place] and speak with his wife regarding this option. CTC also assist patient in calling his wife.    [facility] spoke with patient's wife, Arron [phone], who reports she thinks it is best for patient to discharge to his cousin, Bartolo bentley, as we agreed upon yesterday in the care conference. She reports his cousin will then drive him home this weekend. She does not want patient to take a taxi/shuttle. SONALI in chart.     [facility] called and spoke with patient's cousin, Gerardo [phone]. He is willing to pick patient up for discharge tomorrow at 11:00AM. [facility] provided him the address and unit phone number to call when he is here. SONALI in chart.        Discharge Plan or Goal:  Home with community supports        Barriers to Discharge:  symptom stabilization, medication management, coordination of care        Referral Status:    Established provider:  HUAN Antonio at [facility]        Legal Status:  Voluntary     Mami Olson, [facility], 11/10/2021, 12:10 PM

## 2021-11-10 NOTE — DISCHARGE INSTRUCTIONS
Behavioral Discharge Planning and Instructions    Summary: You were admitted on 10/30/2021 due to Delusional Thoughts.  You were treated by Dr Patel Justin and discharged on 11/11/2021 from [facility] to family member's home    Main Diagnosis: Psychosis    Health Care Follow-up:   Appointment Type: Medication Management  Provider: HUAN Antonio  Date & Time: Tuesday, November 23rd at 1:45PM  Clinic: [facility]  Address: [address] [place], [place] [address], [address], [place] [address]  Phone: [phone]  Additional Notes: This is an in-person appointment. If you need to cancel, reschedule, or have any questions about this appointment please call [phone].    Appointment Type: Psychiatry  Provider: Janet Tapia MD  Date & Time: Monday, January 10th at 2:10PM  Clinic: [facility]  Address: [address] [place] [id number]  Phone: [phone]  Additional Notes: This is an in-person appointment. Please arrive 15 minutes prior to your scheduled appointment time. If you need to cancel, reschedule, or have any questions about this appointment please call [phone].    Attend all scheduled appointments with your outpatient providers. Call at least 24 hours in advance if you need to reschedule an appointment to ensure continued access to your outpatient providers.     Major Treatments, Procedures and Findings:  You were provided with: a psychiatric assessment, medication evaluation and/or management, group therapy and milieu management    Symptoms to Report: increased confusion or losing more sleep    Early warning signs can include: sleep disturbances increased unusual thinking, such as paranoia or hearing voices    Safety and Wellness:  Take all medicines as directed.  Make no changes unless your doctor suggests them.      Follow treatment recommendations.  Refrain from alcohol and non-prescribed drugs.  If there is a concern for safety, call [phone].    Resources:   National  "[employer] Mental Illness (www.[place].jennifer.org): 645.862.2719 or 327-420-6544.  Crisis text line: Text \"[place]\" to 967378. Free, confidential, 24/7.  [facility] Mobile Crisis Response Team (CRT):  [phone] or [phone]     General Medication Instructions:   See your medication sheet(s) for instructions.   Take all medicines as directed.  Make no changes unless your doctor suggests them.   Go to all your doctor visits.  Be sure to have all your required lab tests. This way, your medicines can be refilled on time.  Do not use any drugs not prescribed by your doctor.  Avoid alcohol.    Advance Directives:   Scanned document on file with [facility]? No scanned doc  Is document scanned? Pt states no documents  Honoring Choices Your Rights Handout: Informed and given  Was more information offered? Materials given    The Treatment team has appreciated the opportunity to work with you. If you have any questions or concerns about your recent admission, you can contact the unit which can receive your call 24 hours a day, 7 days a week. They will be able to get in touch with a Provider if needed. The unit number is [phone].  "

## 2021-11-10 NOTE — PLAN OF CARE
Problem: Behavioral Health Plan of Care  Goal: Plan of Care Review  Outcome: Improving     Problem: Suicidal Behavior  Goal: Suicidal Behavior is Absent or Managed  Outcome: Improving     Pt was visible on the unit pacing hallway. He was calm and quiet. Affect was flat and blunted. Kept to himself while out in the milieux. Did not socialized or engaged with peers. Attended community meeting. He was out for all meals and ate 100%. C/o headache and rated pain 2/10. He was given prn Tylenol 650 mg which was helpful. Denied anxiety and depression. Denied SI/HI. Denied hallucination. Contracted for safety. Prn Trazodone 50 mg was given for sleep. B/p still elevated 143/98 sitting and 142/100 standing but does not meet order parameter to be given prn b/p medication. Had care conference at about 2 pm just before this shift and wife was present. Pt stated everything went well. No other concerns or behavior noted at this time. Will continue to monitor and will assist if need arise.

## 2021-11-10 NOTE — PROVIDER NOTIFICATION
11/10/21 1400   Engagement   Intervention Group   Topic Social/communication skills   Topic Detail   (Process Group: Effective Communication)   Attendance Attended   Patient Response Demonstrated understanding of materials provided;Was respectful   Concentrated on Task duration of group   Cognition Goal-directed;Follows through with task   Mood/Affect Blunted;Content   Social/Behavioral Cooperative   Thought Content [place]     GROUP LENGTH (MINS):   30 mins,   RELEVANT PRIMARY DIAGNOSIS: Patient Active Problem List   Diagnosis     Psychosis (H)     Undifferentiated schizophrenia (H)        TREATMENT MODALITY:      []CBT       []DBT       []ACT       []Interpersonal psychotherapy                                 []Psychoeducational therapy       [x]Skill development       []Other:        ATTENDANCE:        [x]Stayed for entire group     []Arrived late    [] Left early       # OF PATIENTS IN GROUP: 4   BILLABLE:     [x]Yes            []No   Notes:

## 2021-11-10 NOTE — PROGRESS NOTES
"   11/10/21 0935   Engagement   Intervention Group   Topic Detail Charades for socialization, physical activity, and frustration tolerance to manage mental health symptoms   Attendance Attended   Patient Response Demonstrated understanding of materials provided;Prosocial behavior   Concentrated on Task duration of group   Cognition Follows through with task   Mood/Affect Content   Social/Behavioral Cooperative;Engaged   Thought Content Reality oriented   Goals addressed in session today Pt progressing toward OT goal, appropriately participating in group for short duration before stating, \"can we be done?\" Pt acting out a few charades cards, demonstrating good frustration tolerance and continuing on for peer to guess action.     "

## 2021-11-10 NOTE — PROGRESS NOTES
11/10/21 1509   Engagement   Intervention Group   Topic Detail Water coloring day 2 for relaxation, symptom management, and sequencing to manage mental health symptoms   Attendance Attended   Patient Response Demonstrated understanding of materials provided   Concentrated on Task duration of group   Cognition Follows through with task;Takes initiative   Mood/Affect Content;Blunted   Social/Behavioral Cooperative;Engaged   Thought Content [place]   Goals addressed in session today Pt progressing toward OT goal by participating in group activity. Pt completing complex water color design, demonstrating good time management, completing task within allotted time.

## 2021-11-10 NOTE — PROVIDER NOTIFICATION
11/10/21 1100   Engagement   Intervention Group   Topic Insight development/self-awareness   Topic Detail CBT: Unhelpful Thinking Styles   Attendance Attended   Patient Response Was respectful   Concentrated on Task duration of group   Cognition Confused   Mood/Affect Flat   Social/Behavioral Cooperative   Thought Content [place]     GROUP LENGTH (MINS):   35 min   RELEVANT PRIMARY DIAGNOSIS: Patient Active Problem List   Diagnosis     Psychosis (H)     Undifferentiated schizophrenia (H)        TREATMENT MODALITY:      [x]CBT       []DBT       []ACT       []Interpersonal psychotherapy                                 []Psychoeducational therapy       []Skill development       []Other:        ATTENDANCE:        [x]Stayed for entire group     []Arrived late    [] Left early       # OF PATIENTS IN GROUP: 4   BILLABLE:     [x]Yes            []No   Notes:

## 2021-11-11 VITALS
OXYGEN SATURATION: 100 % | SYSTOLIC BLOOD PRESSURE: 139 MMHG | RESPIRATION RATE: 16 BRPM | HEART RATE: 91 BPM | TEMPERATURE: 97.8 F | DIASTOLIC BLOOD PRESSURE: 100 MMHG | WEIGHT: 162.5 LBS | HEIGHT: 70 IN | BODY MASS INDEX: 23.26 KG/M2

## 2021-11-11 PROCEDURE — 250N000013 HC RX MED GY IP 250 OP 250 PS 637: Performed by: INTERNAL MEDICINE

## 2021-11-11 PROCEDURE — 250N000013 HC RX MED GY IP 250 OP 250 PS 637: Performed by: PSYCHIATRY & NEUROLOGY

## 2021-11-11 PROCEDURE — 99239 HOSP IP/OBS DSCHRG MGMT >30: CPT | Performed by: PSYCHIATRY & NEUROLOGY

## 2021-11-11 PROCEDURE — 250N000013 HC RX MED GY IP 250 OP 250 PS 637: Performed by: NURSE PRACTITIONER

## 2021-11-11 RX ORDER — AMLODIPINE BESYLATE 5 MG/1
5 TABLET ORAL DAILY
Qty: 30 TABLET | Refills: 0 | Status: SHIPPED | OUTPATIENT
Start: 2021-11-11

## 2021-11-11 RX ORDER — ARIPIPRAZOLE 15 MG/1
15 TABLET ORAL DAILY
Qty: 30 TABLET | Refills: 0 | Status: SHIPPED | OUTPATIENT
Start: 2021-11-11

## 2021-11-11 RX ADMIN — FLUOXETINE HYDROCHLORIDE 20 MG: 20 CAPSULE ORAL at 08:39

## 2021-11-11 RX ADMIN — ARIPIPRAZOLE 15 MG: 15 TABLET ORAL at 08:39

## 2021-11-11 RX ADMIN — AMLODIPINE BESYLATE 5 MG: 5 TABLET ORAL at 08:39

## 2021-11-11 NOTE — PLAN OF CARE
Patient discharged from the unit in a stable condition per provider ordered. Explained discharged medications,appointments.Personal  Belongings checklist, reviewed/checked by Behavioral Tech and pt, Asked pt if he have question concerning his medications,appointment and belongings? Patient responded no. Discharged documents were signed and witnessed by writer.

## 2021-11-11 NOTE — PLAN OF CARE
Problem: Behavioral Health Plan of Care  Goal: Plan of Care Review  Recent Flowsheet Documentation  Taken 11/10/2021 1600 by Dafne Arce RN  Plan of Care Reviewed With: patient  Patient Agreement with Plan of Care: agrees     Pt was visible on the unit pacing hallway, calm and quiet. Affect flat and blunted. Denies anxiety and depression, pain/discomfort and other psych symptoms. Contracted for safety.   PRN TYLENOL given per patient request for headache with effects. Will continue to monitor.

## 2021-11-11 NOTE — PLAN OF CARE
Occupational Therapy Discharge Note    Patient Name:  Harley Daniel    D: Refer to daily doc flowsheets for details of progress toward goals.  A: Improvement seen in participation in group engagement for managing sx.  P: Patient discharging to Home with community supports. Discontinue OT Care Plan goals and interventions.    Carlos Roberts, OTR    Date: 11/11/2021  Time: 1:49 PM    Problem: Relapse Prevention  Goal: Engage in OT Group  Description: Engage in at least 1 OT group activity daily in a reality based manner and free of paranoia   Outcome: Completed

## 2021-11-11 NOTE — DISCHARGE SUMMARY
"PSYCHIATRY  DISCHARGE SUMMARY     CHIEF COMPLAINT   \"Not feeling great\"       HOSPITAL COURSE   Patient was admitted and observed. He was noted to have substantial paranoia and delusional thinking, particular believing family members had been replaced. He was willing to stay voluntary and start Abilify. His dose of Abilify was gradually increased to 15 mg a day. He improved gradually on this dose and became less paranoid. He was generally quiet, but cooperative and visible. He was able to have a meeting with wife and demonstrate that he was having only minimal paranoia. By day of discharge he was calm and cooperative with no suicidal or homicidal thoughts. He was not revealing delusions and not showing obvious paranoia.     During admission patient was seen by hospitalist who started him on Amlodipine.     MEDICATIONS   Medications:  Scheduled Meds:    amLODIPine  5 mg Oral Daily     ARIPiprazole  15 mg Oral Daily     FLUoxetine  20 mg Oral Daily     Continuous Infusions:  PRN Meds:.acetaminophen, alum & mag hydroxide-simethicone, cloNIDine, docusate sodium, hydrOXYzine, senna-docusate, traZODone    Medication adherence issues: MS Med Adherence Y/N: Yes, Not confident in efficacy of medication  Medication side effects: MEDICATION SIDE EFFECTS: no side effects reported  Benefit: Yes / No: Yes       ROS   Review of Systems is otherwise negative including HEENT, CV, Respiratory, GI, , Musculoskeletal, Neurologic, Dermatologic, Endocrine, Immunological, Constitutional systems         MENTAL STATUS EXAM   Vitals: BP (!) 145/91 (BP Location: Left arm, Patient Position: Sitting)   Pulse 61   Temp 98.7  F (37.1  C) (Oral)   Resp 16   Ht 1.778 m (5' 10\")   Wt 73.7 kg (162 lb 8 oz)   SpO2 100%   BMI 23.32 kg/m      Appearance:  Casually groomed  Mood:  depressed  Affect: flat  Suicidal Ideation: denies  Homicidal Ideation:denies  Thought process:concrete  Thought content:not revealing delusions or paranoia  Fund of " Knowledge: Average  Attention/Concentration: normal  Language ability:  Intact  Memory:  Immediate recall intact, Short-term memory intact and Long-term memory normal  Insight:  poor.  Judgement: improved  Orientation: Yes, x4  Psychomotor Behavior: normal  Muscle Strength and Tone: MuscleStrength: Normal  Gait and Station: Normal    Patient showing gradual improvement     LABS   personally reviewed.     No results found for: PHENYTOIN, PHENOBARB, VALPROATE, CBMZ       DIAGNOSIS   Principal Problem:    Undifferentiated schizophrenia (H)    Active Problem List:  Patient Active Problem List   Diagnosis     Psychosis (H)     Undifferentiated schizophrenia (H)          PLAN   Discharge to home      amLODIPine  5 mg Oral Daily     ARIPiprazole  15 mg Oral Daily     FLUoxetine  20 mg Oral Daily         Follow up as arranged by       More than 35 minutes spent on this visit with more than 50% time spent on coordination of care with staff, reviewing medical record, psychoeducation, providing supportive therapy regarding coping with chronic mental illness, entering orders and preparing documentation for the visit      Patel Justin MD

## 2021-11-11 NOTE — PLAN OF CARE
Problem: Behavioral Health Plan of Care  Goal: Absence of New-Onset Illness or Injury  Outcome: Improving     Problem: Suicidal Behavior  Goal: Suicidal Behavior is Absent or Managed  Outcome: Improving     Continue monitoring for suicide behavior.  Pt is calm, slept well during the night. No apparent SI/HI, stress, anxiety, or discomfort noted during hours of sleep. Will monitor.

## 2021-11-11 NOTE — PLAN OF CARE
Discharge plan or goal: Discharge home and follow up with referrals.     Today's Plan: Patient's cousin to  11:00am today.     Pt has the following outpatient appointment(s) scheduled:     Appointment Type: Medication Management  Provider: HUAN Antonio  Date & Time: Tuesday, November 23rd at 1:45PM  Clinic: [facility]  Address: [address] [place] [address], [address], [place] [address]  Phone: [phone]  Additional Notes: This is an in-person appointment. If you need to cancel, reschedule, or have any questions about this  appointment please call [phone].    Appointment Type: Psychiatry  Provider: Janet Tapia MD  Date & Time: Monday, January 10th at 2:10PM  Clinic: [facility]  Address: [address] [place] [id number]  Phone: [phone]  Additional Notes: This is an in-person appointment. Please arrive 15 minutes prior to your scheduled appointment time. If  you need to cancel, reschedule, or have any questions about this appointment please call [phone].      Pt has the following professional community support(s): Medication Management, Psychiatry    Legal Status: [facility]    Diagnosis/Procedure:   Patient Active Problem List   Diagnosis    Psychosis (H)    Undifferentiated schizophrenia (H)       Care Rounds Attendance:   [facility]   RN   Charge RN   OT/TR  MD/CNP    [facility], [facility]